# Patient Record
Sex: FEMALE | Race: BLACK OR AFRICAN AMERICAN | NOT HISPANIC OR LATINO | Employment: FULL TIME | URBAN - METROPOLITAN AREA
[De-identification: names, ages, dates, MRNs, and addresses within clinical notes are randomized per-mention and may not be internally consistent; named-entity substitution may affect disease eponyms.]

---

## 2018-04-15 ENCOUNTER — HOSPITAL ENCOUNTER (EMERGENCY)
Age: 23
Discharge: HOME | End: 2018-04-15
Attending: EMERGENCY MEDICINE | Admitting: EMERGENCY MEDICINE

## 2018-04-15 ENCOUNTER — APPOINTMENT (OUTPATIENT)
Dept: GENERAL RADIOLOGY | Age: 23
End: 2018-04-15
Attending: EMERGENCY MEDICINE

## 2018-04-15 VITALS
OXYGEN SATURATION: 100 % | SYSTOLIC BLOOD PRESSURE: 115 MMHG | RESPIRATION RATE: 18 BRPM | HEIGHT: 60 IN | BODY MASS INDEX: 19.44 KG/M2 | TEMPERATURE: 98.1 F | DIASTOLIC BLOOD PRESSURE: 84 MMHG | WEIGHT: 99 LBS | HEART RATE: 85 BPM

## 2018-04-15 DIAGNOSIS — R07.9 CHEST PAIN, UNSPECIFIED TYPE: Primary | ICD-10-CM

## 2018-04-15 LAB
B-HCG UR QL: NEGATIVE
BASOPHILS # BLD AUTO: 0 K/MCL (ref 0–0.3)
BASOPHILS NFR BLD AUTO: 1 %
BNP SERPL-MCNC: <5 PG/ML
D DIMER PPP FEU-MCNC: 0.21 MG/L (FEU)
DIFFERENTIAL METHOD BLD: ABNORMAL
EOSINOPHIL # BLD AUTO: 0 K/MCL (ref 0.1–0.5)
EOSINOPHIL NFR SPEC: 1 %
ERYTHROCYTE [DISTWIDTH] IN BLOOD: 12.1 % (ref 11–15)
HCT VFR BLD CALC: 33.3 % (ref 36–46.5)
HGB BLD-MCNC: 10.9 G/DL (ref 12–15.5)
IMM GRANULOCYTES # BLD AUTO: 0 K/MCL (ref 0–0.2)
IMM GRANULOCYTES NFR BLD: 0 %
LYMPHOCYTES # BLD MANUAL: 2.3 K/MCL (ref 1–4.8)
LYMPHOCYTES NFR BLD MANUAL: 51 %
MCH RBC QN AUTO: 27.1 PG (ref 26–34)
MCHC RBC AUTO-ENTMCNC: 32.7 G/DL (ref 32–36.5)
MCV RBC AUTO: 82.8 FL (ref 78–100)
MONOCYTES # BLD MANUAL: 0.3 K/MCL (ref 0.3–0.9)
MONOCYTES NFR BLD MANUAL: 6 %
NEUTROPHILS # BLD: 1.8 K/MCL (ref 1.8–7.7)
NEUTROPHILS NFR BLD AUTO: 41 %
NRBC BLD MANUAL-RTO: 0 %
PLATELET # BLD: 343 K/MCL (ref 140–450)
RBC # BLD: 4.02 MIL/MCL (ref 4–5.2)
WBC # BLD: 4.4 K/MCL (ref 4.2–11)

## 2018-04-15 PROCEDURE — 85379 FIBRIN DEGRADATION QUANT: CPT

## 2018-04-15 PROCEDURE — 99285 EMERGENCY DEPT VISIT HI MDM: CPT

## 2018-04-15 PROCEDURE — 85025 COMPLETE CBC W/AUTO DIFF WBC: CPT

## 2018-04-15 PROCEDURE — 93005 ELECTROCARDIOGRAM TRACING: CPT | Performed by: EMERGENCY MEDICINE

## 2018-04-15 PROCEDURE — 84484 ASSAY OF TROPONIN QUANT: CPT

## 2018-04-15 PROCEDURE — 83880 ASSAY OF NATRIURETIC PEPTIDE: CPT

## 2018-04-15 PROCEDURE — 80047 BASIC METABLC PNL IONIZED CA: CPT

## 2018-04-15 PROCEDURE — 71045 X-RAY EXAM CHEST 1 VIEW: CPT

## 2018-04-15 PROCEDURE — 10002803 HB RX 637: Performed by: EMERGENCY MEDICINE

## 2018-04-15 PROCEDURE — 81025 URINE PREGNANCY TEST: CPT | Performed by: EMERGENCY MEDICINE

## 2018-04-15 PROCEDURE — 71045 X-RAY EXAM CHEST 1 VIEW: CPT | Performed by: RADIOLOGY

## 2018-04-15 RX ORDER — ALBUTEROL SULFATE 90 UG/1
2 AEROSOL, METERED RESPIRATORY (INHALATION) ONCE
Status: COMPLETED | OUTPATIENT
Start: 2018-04-15 | End: 2018-04-15

## 2018-04-15 RX ADMIN — ALBUTEROL SULFATE 2 PUFF: 90 AEROSOL, METERED RESPIRATORY (INHALATION) at 21:15

## 2018-04-15 ASSESSMENT — ENCOUNTER SYMPTOMS
SORE THROAT: 1
EYE PAIN: 0
HEADACHES: 0
ABDOMINAL PAIN: 0
WEAKNESS: 0
NUMBNESS: 0
DIARRHEA: 0
DIAPHORESIS: 0
SHORTNESS OF BREATH: 1
BACK PAIN: 0
FEVER: 0
BRUISES/BLEEDS EASILY: 0
UNEXPECTED WEIGHT CHANGE: 0
COUGH: 1
DIZZINESS: 0
NAUSEA: 0
BLOOD IN STOOL: 0
CHEST TIGHTNESS: 1
VOMITING: 0
CHILLS: 0
ADENOPATHY: 0

## 2018-04-15 ASSESSMENT — HEART SCORE
HISTORY: SLIGHTLY SUSPICIOUS
HEART SCORE: 0
EKG: NORMAL
AGE: LESS THAN OR EQUAL TO 45
RISK FACTORS: NO RISK FACTORS KNOWN
TROPONIN: EQUAL OR LESS THAN NORMAL LIMIT

## 2018-04-15 ASSESSMENT — PAIN SCALES - GENERAL
PAINLEVEL_OUTOF10: 9
PAINLEVEL_OUTOF10: 5

## 2018-04-16 LAB
ANION GAP BLD CALC-SCNC: 16 MMOL/L
ATRIAL RATE (BPM): 100
BUN BLD-MCNC: 9 MG/DL (ref 6–20)
CA-I BLD ISE-SCNC: 1.13 MMOL/L (ref 1.15–1.29)
CHLORIDE BLD-SCNC: 104 MMOL/L (ref 98–107)
CO2 BLD-SCNC: 26 MMOL/L (ref 19–24)
CREAT BLD-MCNC: 0.7 MG/DL (ref 0.51–0.95)
CREAT BLD-MCNC: >90 MG/DL
GLUCOSE BLD-MCNC: 99 MG/DL (ref 65–99)
HCT VFR BLD CALC: 34 % (ref 36–46.5)
HGB BLD-MCNC: 11.6 G/DL (ref 12–15.5)
P AXIS (DEGREES): 76
POTASSIUM BLD-SCNC: 3.7 MMOL/L (ref 3.4–5.1)
PR-INTERVAL (MSEC): 140
QRS-INTERVAL (MSEC): 74
QT-INTERVAL (MSEC): 338
QTC: 436
R AXIS (DEGREES): 88
REPORT TEXT: NORMAL
SODIUM BLD-SCNC: 141 MMOL/L (ref 135–145)
T AXIS (DEGREES): 63
TROPONIN I BLD-MCNC: <0.1 NG/ML
VENTRICULAR RATE EKG/MIN (BPM): 100

## 2021-06-24 ENCOUNTER — IMMUNIZATION (OUTPATIENT)
Dept: NURSING | Facility: CLINIC | Age: 26
End: 2021-06-24
Payer: COMMERCIAL

## 2021-06-24 PROCEDURE — 0001A PR COVID VAC PFIZER DIL RECON 30 MCG/0.3 ML IM: CPT

## 2021-06-24 PROCEDURE — 91300 PR COVID VAC PFIZER DIL RECON 30 MCG/0.3 ML IM: CPT

## 2021-07-05 ENCOUNTER — OFFICE VISIT (OUTPATIENT)
Dept: OBGYN | Facility: CLINIC | Age: 26
End: 2021-07-05
Payer: COMMERCIAL

## 2021-07-05 VITALS
DIASTOLIC BLOOD PRESSURE: 64 MMHG | WEIGHT: 113 LBS | BODY MASS INDEX: 21.34 KG/M2 | HEIGHT: 61 IN | SYSTOLIC BLOOD PRESSURE: 106 MMHG

## 2021-07-05 DIAGNOSIS — Z11.1 SCREENING EXAMINATION FOR PULMONARY TUBERCULOSIS: Primary | ICD-10-CM

## 2021-07-05 DIAGNOSIS — Z30.46 ENCOUNTER FOR NEXPLANON REMOVAL: ICD-10-CM

## 2021-07-05 PROCEDURE — 11982 REMOVE DRUG IMPLANT DEVICE: CPT | Performed by: ADVANCED PRACTICE MIDWIFE

## 2021-07-05 PROCEDURE — 36415 COLL VENOUS BLD VENIPUNCTURE: CPT | Performed by: ADVANCED PRACTICE MIDWIFE

## 2021-07-05 PROCEDURE — 86481 TB AG RESPONSE T-CELL SUSP: CPT | Performed by: ADVANCED PRACTICE MIDWIFE

## 2021-07-05 SDOH — HEALTH STABILITY: MENTAL HEALTH: HOW OFTEN DO YOU HAVE A DRINK CONTAINING ALCOHOL?: NEVER

## 2021-07-05 SDOH — HEALTH STABILITY: MENTAL HEALTH: HOW OFTEN DO YOU HAVE 6 OR MORE DRINKS ON ONE OCCASION?: NEVER

## 2021-07-05 SDOH — HEALTH STABILITY: MENTAL HEALTH: HOW MANY STANDARD DRINKS CONTAINING ALCOHOL DO YOU HAVE ON A TYPICAL DAY?: NOT ASKED

## 2021-07-05 ASSESSMENT — MIFFLIN-ST. JEOR: SCORE: 1194.94

## 2021-07-05 NOTE — PROGRESS NOTES
Nexplanon Removal:     Is a pregnancy test required: No.  Was a consent obtained?  Yes    Noelle Johnson is here for removal of etonogestrel implant Nexplanon/Implanon    Indication: unscheduled bleeding. Declines troubleshooting Nexplanon today. Will likely accept paragard IUD-- reviewed condoms with any intercourse before insertion, may RTC at any time for insertion.    Preoperative Diagnosis: etonogestrel implant  Postoperative Diagnosis: etonogestrel implant removed    Technique: On the left arm  Skin prep Betadine  Anesthesia 1% lidocaine, with epi  Procedure: Small incision (<5mm) was made at distal end of palpable implant, curved hemostat or mosquito forceps was used to isolate the implant and bring it to the incision, the fibrous capsule containing the implant  was incised and the Implant was removed intact.      EBL: minimal  Complications:  No  Tolerance:  Pt tolerated procedure well and was in stable condition.   Dressing:    A pressure bandage was placed for the next 12-24 hours.    Contraception was discussed and patient chose the following method Parguard IUD      Follow up: Pt was instructed to call if bleeding, severe pain or foul smell.     Quantiferon gold ordered for pre-nursing school testing. Encouraged to follow up with primary care for other vaccinations.    Plan to offer GC/CT and pap smear if indicated with Paragard insertion.      SHEEBA Carlson CNM

## 2021-07-07 LAB
GAMMA INTERFERON BACKGROUND BLD IA-ACNC: 0.15 IU/ML
M TB IFN-G CD4+ BCKGRND COR BLD-ACNC: 9.85 IU/ML
M TB TUBERC IFN-G BLD QL: NEGATIVE
MITOGEN IGNF BCKGRD COR BLD-ACNC: 0 IU/ML
MITOGEN IGNF BCKGRD COR BLD-ACNC: 0 IU/ML

## 2021-07-08 ENCOUNTER — ALLIED HEALTH/NURSE VISIT (OUTPATIENT)
Dept: NURSING | Facility: CLINIC | Age: 26
End: 2021-07-08
Payer: COMMERCIAL

## 2021-07-08 DIAGNOSIS — Z23 NEED FOR VACCINATION: Primary | ICD-10-CM

## 2021-07-08 PROCEDURE — 90472 IMMUNIZATION ADMIN EACH ADD: CPT

## 2021-07-08 PROCEDURE — 90716 VAR VACCINE LIVE SUBQ: CPT

## 2021-07-08 PROCEDURE — 90746 HEPB VACCINE 3 DOSE ADULT IM: CPT

## 2021-07-08 PROCEDURE — 90471 IMMUNIZATION ADMIN: CPT

## 2021-07-15 ENCOUNTER — IMMUNIZATION (OUTPATIENT)
Dept: NURSING | Facility: CLINIC | Age: 26
End: 2021-07-15
Attending: INTERNAL MEDICINE
Payer: COMMERCIAL

## 2021-07-15 PROCEDURE — 0002A PR COVID VAC PFIZER DIL RECON 30 MCG/0.3 ML IM: CPT

## 2021-07-15 PROCEDURE — 91300 PR COVID VAC PFIZER DIL RECON 30 MCG/0.3 ML IM: CPT

## 2021-07-30 ENCOUNTER — HOSPITAL ENCOUNTER (EMERGENCY)
Facility: CLINIC | Age: 26
Discharge: HOME | End: 2021-07-31
Attending: EMERGENCY MEDICINE | Admitting: EMERGENCY MEDICINE
Payer: OTHER MISCELLANEOUS

## 2021-07-30 ENCOUNTER — NURSE TRIAGE (OUTPATIENT)
Dept: NURSING | Facility: CLINIC | Age: 26
End: 2021-07-30
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
## 2021-07-30 PROCEDURE — 99283 EMERGENCY DEPT VISIT LOW MDM: CPT

## 2021-07-31 VITALS
HEART RATE: 86 BPM | TEMPERATURE: 97.9 F | RESPIRATION RATE: 18 BRPM | SYSTOLIC BLOOD PRESSURE: 118 MMHG | DIASTOLIC BLOOD PRESSURE: 85 MMHG | OXYGEN SATURATION: 100 %

## 2021-07-31 PROCEDURE — 36415 COLL VENOUS BLD VENIPUNCTURE: CPT | Performed by: EMERGENCY MEDICINE

## 2021-07-31 PROCEDURE — 86706 HEP B SURFACE ANTIBODY: CPT | Performed by: EMERGENCY MEDICINE

## 2021-07-31 PROCEDURE — 86803 HEPATITIS C AB TEST: CPT | Performed by: EMERGENCY MEDICINE

## 2021-07-31 PROCEDURE — 87389 HIV-1 AG W/HIV-1&-2 AB AG IA: CPT | Performed by: EMERGENCY MEDICINE

## 2021-07-31 PROCEDURE — 87340 HEPATITIS B SURFACE AG IA: CPT | Performed by: EMERGENCY MEDICINE

## 2021-07-31 PROCEDURE — 87522 HEPATITIS C REVRS TRNSCRPJ: CPT | Performed by: EMERGENCY MEDICINE

## 2021-07-31 PROCEDURE — 86704 HEP B CORE ANTIBODY TOTAL: CPT | Performed by: EMERGENCY MEDICINE

## 2021-07-31 ASSESSMENT — ENCOUNTER SYMPTOMS: WOUND: 1

## 2021-07-31 NOTE — ED PROVIDER NOTES
History   Chief Complaint:  Body Fluid Exposure       HPI   Noelle Johnson is a 25 year old female who presents with body fluid exposure. The patient reports that she was stuck with a used insulin needle while at work earlier today. 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  The staff over there indicated the patient did not have any hepatitis or HIV, but the patient was concerned regarding the veracity of this information.  She does not believe she has any history of the same either.  She notes a small puncture wound initially but this is resolved to her left index finger, currently covered by a Band-Aid.  Immediately after the injury she washed her hands.    Review of Systems   Skin: Positive for wound (needle stick).   All other systems reviewed and are negative.        Allergies:  The patient has no known allergies.     Medications:  Prenatal vitamin    Past Medical History:     The patient denies significant past medical history.      Past Surgical History:     section    Social History:  The patient presents to the emergency department with her daughter.  The patient works in a group home.    Physical Exam     Patient Vitals for the past 24 hrs:   BP Temp Temp src Pulse Resp SpO2   21 (!) 129/99 97.9  F (36.6  C) Temporal 85 18 99 %       Physical Exam  Constitutional: Alert, attentive  CV: normal perfusion  Chest: Effort normal    MSK: Normal range of motion. bandaid to left index  Neurological: Alert, attentive  Skin: Skin is warm and dry.      Emergency Department Course   Laboratory:  Hepatitis B surface antibody immunity: Pending  Hepatitis B surface antigen: Pending  Hepatitis B core antibody: Pending    Hepatitis C RNA, Quantitative by PCR: Pending  Hepatitis C Antibody: Pending    HIV Antigen Antibody Combo: Pending    Emergency Department Course:  Reviewed:  I reviewed the patient's nursing notes, vitals, past medical records, and Care Everywhere.      Assessments:  0123 I performed an exam of the patient and obtained history, as documented above.     0243 I rechecked the patient and discussed the plan. Patient is comfortable with discharge to home at this time.    Disposition:  The patient was discharged to home.     Impression & Plan   Medical Decision Making:  This is a pleasant 25-year-old female presents for evaluation after accidental needlestick injury.  Source information is unknown.  Screening labs are drawn and we have referred the patient to follow-up with the group home protocol regarding drawing labs for the source patient.  At this time she would like to take HIV PrEP so this is initiated prescribed to the patient.  Follow-up with occupational health for flare up protocol.  Return precautions for any concerns.    Diagnosis:    ICD-10-CM   1. Accident caused by hypodermic needle, initial encounter  W46.0XXA       Discharge Medications:  Current Discharge Medication List          Scribe Disclosure:  Iveth MEADOWS, am serving as a scribe at 1:28 AM on 7/31/2021 to document services personally performed by Trever Elise MD, based on my observations and the provider's statements to me.    July 30, 2021   [facility] Emergency Department     Trever Elise MD  07/31/21 0549

## 2021-07-31 NOTE — TELEPHONE ENCOUNTER
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 Pt. Poked her left hand finger next to her thumb. Pt. Says that the poke did draw blood. Pt. Says that she did not fill out an incident report at her work but told her boss. Pt. Will go to the [facility] ER now. COVID 19 Nurse Triage Plan/Patient Instructions    Please be aware that novel coronavirus (COVID-19) may be circulating in the community. If you develop symptoms such as fever, cough, or SOB or if you have concerns about the presence of another infection including coronavirus (COVID-19), please contact your health care provider or visit [email or web address].     Disposition/Instructions    ED Visit recommended. Follow protocol based instructions.     Bring Your Own Device:  Please also bring your smart device(s) (smart phones, tablets, laptops) and their charging cables for your personal use and to communicate with your care team during your visit.    Thank you for taking steps to prevent the spread of this virus.  o Limit your contact with others.  o Wear a simple mask to cover your cough.  o Wash your hands well and often.    Resources    [facility]: About COVID-19: [email or web address]    CDC: What to Do If You're Sick: www.cdc.gov/coronavirus/2019-ncov/about/steps-when-sick.html    CDC: Ending Home Isolation: www.cdc.gov/coronavirus/2019-ncov/hcp/disposition-in-home-patients.html     CDC: Caring for Someone: www.cdc.gov/coronavirus/2019-ncov/if-you-are-sick/care-for-someone.html     [facility]: Interim Guidance for Hospital Discharge to Home: www.health.[facility].[place]/diseases/coronavirus/hcp/hospdischarge.pdf    [facility] clinical trials (COVID-19 research studies): clinicalaffairs.[facility]/umn-clinical-trials     Below are the COVID-19 hotlines at the [place] Department of [facility] ([facility]). Interpreters are available.   o For health questions: Call  [phone] or [phone] (7 a.m. to 7 p.m.)  o For questions about schools and childcare: Call 380-367-3215 or 1-235.839.9172 (7 a.m. to 7 p.m.)

## 2021-07-31 NOTE — ED TRIAGE NOTES
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 Pt reports she was stuck by a used insulin pen needle. ABCs intact.

## 2021-08-01 LAB
HBV CORE AB SERPL QL IA: NONREACTIVE
HCV AB SERPL QL IA: NONREACTIVE

## 2021-08-02 ENCOUNTER — OFFICE VISIT (OUTPATIENT)
Dept: OBGYN | Facility: CLINIC | Age: 26
End: 2021-08-02
Payer: COMMERCIAL

## 2021-08-02 VITALS — BODY MASS INDEX: 21.5 KG/M2 | SYSTOLIC BLOOD PRESSURE: 108 MMHG | DIASTOLIC BLOOD PRESSURE: 68 MMHG | WEIGHT: 113.8 LBS

## 2021-08-02 DIAGNOSIS — Z30.42 SURVEILLANCE FOR DEPO-PROVERA CONTRACEPTION: Primary | ICD-10-CM

## 2021-08-02 DIAGNOSIS — Z30.09 BIRTH CONTROL COUNSELING: ICD-10-CM

## 2021-08-02 LAB
HBV SURFACE AB SERPL IA-ACNC: 0.79 M[IU]/ML
HBV SURFACE AG SERPL QL IA: NONREACTIVE
HCG IFA URINE: NEGATIVE
HIV 1+2 AB+HIV1 P24 AG SERPL QL IA: NONREACTIVE

## 2021-08-02 PROCEDURE — 99213 OFFICE O/P EST LOW 20 MIN: CPT | Mod: 25 | Performed by: ADVANCED PRACTICE MIDWIFE

## 2021-08-02 PROCEDURE — 84703 CHORIONIC GONADOTROPIN ASSAY: CPT | Performed by: ADVANCED PRACTICE MIDWIFE

## 2021-08-02 PROCEDURE — 96372 THER/PROPH/DIAG INJ SC/IM: CPT | Performed by: ADVANCED PRACTICE MIDWIFE

## 2021-08-02 RX ORDER — MEDROXYPROGESTERONE ACETATE 150 MG/ML
150 INJECTION, SUSPENSION INTRAMUSCULAR
Status: COMPLETED | OUTPATIENT
Start: 2021-08-02 | End: 2022-08-04

## 2021-08-02 RX ADMIN — MEDROXYPROGESTERONE ACETATE 150 MG: 150 INJECTION, SUSPENSION INTRAMUSCULAR at 11:17

## 2021-08-02 NOTE — PATIENT INSTRUCTIONS
Patient Education     Depo-Provera Injection 150 mg/mL  Uses  This medicine is used for the following purposes:    birth control    menstrual problem  Instructions  This medicine will be given to you at the doctor's office.  This medicine may cause you to become more sensitive to the sun. Use sunscreen or wear protective clothing when you are exposed to the sun.  Please tell your doctor and pharmacist about all the medicines you take. Include both prescription and over-the-counter medicines. Also tell them about any vitamins, herbal medicines, or anything else you take for your health.  This medicine may affect the strength of your bones. If you have or are at increased risk for developing osteoporosis (weakening of the bones), your doctor may recommend adding foods containing calcium and vitamin D while on this medicine. Please talk to your doctor for more information.  You may need vitamin and mineral supplements while on this medicine. Please speak with your doctor or pharmacist.  It is very important that you follow your doctor's instructions for all blood tests.  This medicine may interfere with some lab test results. Be sure to tell all your healthcare providers that you are taking this medicine.  It is very important that you keep all appointments for medical exams and tests while on this medicine.  Cautions  Tell your doctor and pharmacist if you ever had an allergic reaction to a medicine. Symptoms of an allergic reaction can include trouble breathing, skin rash, itching, swelling, or severe dizziness.  Some patients with weak hearts may have worsening of symptoms. If you notice difficulty breathing, weight gain, or swelling of your legs or ankles, let your doctor know right away.  This medicine is associated with a rare but very serious medical condition. Please speak with your doctor about symptoms you should look out for while on this medicine. Notify your doctor immediately if you develop those  symptoms.  Some patients taking this medicine have experienced serious side effects. Please speak with your doctor to understand the risks and benefits associated with this medicine.  This medicine is associated with an increased risk of serious heart problems, heart attack, and stroke. Please speak with your doctor about the risks and benefits of using this medicine. Contact your doctor immediately if you experience chest pain or difficulty breathing.  This medicine is associated with an increased risk for serious blood clots. Speak with your doctor about the benefits and risks from using this medicine.  Please check with your doctor before drinking alcohol while on this medicine.  Avoid smoking while on this medicine. Smoking may increase your risk for stroke, heart attack, blood clots, high blood pressure, and other diseases of the heart and blood vessels.  Call the doctor if there are any signs of confusion or unusual changes in behavior.  Tell the doctor or pharmacist if you are pregnant, planning to be pregnant, or breastfeeding.  Do not use this medicine if you are pregnant. If you become pregnant while on this medicine, contact your doctor immediately.  Do not take [place]'s wort while on this medicine.  Ask your pharmacist if this medicine can interact with any of your other medicines. Be sure to tell them about all the medicines you take.  Please tell all your doctors and dentists that you are on this medicine before they provide care.  Do not start or stop any other medicines without first speaking to your doctor or pharmacist.  If you have had a heart attack or a stroke within the past 6 months, talk to your doctor before using this medicine.  Side Effects  The following is a list of some common side effects from this medicine. Please speak with your doctor about what you should do if you experience these or other side effects.    acne    bloating    breast pain or swelling    dizziness    feeling of  heat or flushing    headaches    reaction at the area of the injection (pain, redness, swelling)    nausea    problems with sexual functions or desire    stomach pain    vaginal bleeding or spotting between periods    weight gain  Call your doctor or get medical help right away if you notice any of these more serious side effects:    increased risk of a blood clot    bone pain    breast lump or discharge    chest pain    changes in memory, mood, or thinking    depression or feeling sad    swelling of the legs, feet, and hands    fainting    severe or persistent headache    sudden leg pain, swelling, warmth or redness    symptoms of liver damage (such as yellowing of skin or eyes, dark urine, unusual tiredness or weakness; severe stomach or back pain)    dark patches on skin or face    mood changes    seizures    shortness of breath    symptoms of stroke (such as one-sided weakness, slurred speech, confusion)    cramping of the uterus or bleeding from the vagina    blurring or changes of vision    severe or persistent vomiting    sudden or unexplained weight gain  A few people may have an allergic reactions to this medicine. Symptoms can include difficulty breathing, skin rash, itching, swelling, or severe dizziness. If you notice any of these symptoms, seek medical help quickly.  Extra  Please speak with your doctor, nurse, or pharmacist if you have any questions about this medicine.  [email or web address]  IMPORTANT NOTE: This document tells you briefly how to take your medicine, but it does not tell you all there is to know about it.Your doctor or pharmacist may give you other documents about your medicine. Please talk to them if you have any questions.Always follow their advice. There is a more complete description of this medicine available in English.Scan this code on your smartphone or tablet or use the web address below. You can also ask your pharmacist for a printout. If you have any  questions, please ask your pharmacist.     2021 [place].

## 2021-08-02 NOTE — NURSING NOTE
Clinic Administered Medication Documentation          Depo Provera Documentation    URINE HCG: negative    Depo-Provera Standing Order inclusion/exclusion criteria reviewed.   Patient meets: inclusion criteria     BP: 108/68  LAST PAP/EXAM: No results found for: PAP    Prior to injection, verified patient identity using patient's name and date of birth. Medication was administered. Please see MAR and medication order for additional information.     Was entire vial of medication used? Yes  Vial/Syringe: Single dose vial  Expiration Date:  12/23    Patient instructed to remain in clinic for 15 minutes and report any adverse reaction to staff immediately .  NEXT INJECTION DUE: 10/18/21 - 11/1/21

## 2021-08-02 NOTE — NURSING NOTE
"Chief Complaint   Patient presents with     Contraception     Discuss Birth Control       Initial /68 (BP Location: Left arm, Cuff Size: Adult Regular)   Wt 51.6 kg (113 lb 12.8 oz)   LMP 2021 (Approximate)   Breastfeeding No   BMI 21.50 kg/m   Estimated body mass index is 21.5 kg/m  as calculated from the following:    Height as of 21: 1.549 m (5' 1\").    Weight as of this encounter: 51.6 kg (113 lb 12.8 oz).  BP completed using cuff size: regular    Questioned patient about current smoking habits.  Pt. has never smoked.          The following HM Due: NONE           "

## 2021-08-02 NOTE — PROGRESS NOTES
"SUBJECTIVE:   Noelle Johnson is a 25 year old who presents to the clinic for discussion of birth control methods.   She is interested in discussing options \"that won't cause a lot of bleeding.\"    She most recently had the Nexplanon, which was placed in 2021 and removed 2021 due to increased, irregular daily bleeding. At that time, she seemed interested in the ParaGard IUD and planned to come back for placement, but she is feeling a bit more hesitant today and would like a little more review of other options.    Discussed expected bleeding profile with the different types of LARCs and their mechanisms of action. Explained that irregular, untimed bleeding may last up to six months and briefly talked about ways this can be managed. Reviewed CHC options that would provide more reliable and regular withdrawal bleeds. She is neither interested in daily/weekly dosing nor keen about inserting a vaginal ring and feels nervous about IUD placement. She feels more inclined to receive Depo today to allow herself time to reconsider other options and states she may be open to trying the Nexplanon again.    History reviewed. No pertinent past medical history. No pertinent family history.    Past Surgical History:   Procedure Laterality Date      SECTION       OB History    Para Term  AB Living   1 1 0 0 0 0   SAB TAB Ectopic Multiple Live Births   0 0 0 0 1      # Outcome Date GA Lbr Danish/2nd Weight Sex Delivery Anes PTL Lv   1 Para 10/03/20    F CS-Unspec        Menstrual/Gyn History:  Monthly, every 28-30 days  Length of menses: 3-4 days  Menstrual description: normal, without significant cramping/clots  Sexually active with males, in monogamous relationship; using condoms since Nexplanon removal    Denies the following contraindications to estrogen/progesterone combined contraception:  Migraine with aura  Smoking over age 35  Liver disease  Personal history of blood clot or stroke   History " of heart disease  History of breast cancer  Undiagnosed vaginal bleeding  Hypertension  Pregnancy      ROS:   10 point ROS negative other than the symptoms noted above in the HPI.      EXAM:  /68 (BP Location: Left arm, Cuff Size: Adult Regular)   Wt 51.6 kg (113 lb 12.8 oz)   LMP 07/12/2021 (Approximate)   Breastfeeding No   BMI 21.50 kg/m    Body mass index is 21.5 kg/m .     Constitutional: healthy, alert, in no acute distress  Psych: mentation normal, mood/afffect normal/bright    ASSESSMENT/PLAN:    ICD-10-CM    1. Surveillance for Depo-Provera contraception  Z30.42 HCG IFA Urine POCT, Enter/Edit Result     medroxyPROGESTERone (DEPO-PROVERA) injection 150 mg   2. Birth control counseling  Z30.09      There are no contraindications to the use of Depo Provera    COUNSELING:  Reviewed risks and benefits of contraceptive use  Return to clinic in three months for next injection or initiation of another contraceptive method per patient preference.  Discussed proper use of chosen method  Handouts/Instrucions provided      Josephine Loyd, WEDNI, APRN, CNM

## 2021-08-04 ENCOUNTER — TELEPHONE (OUTPATIENT)
Dept: EMERGENCY MEDICINE | Facility: CLINIC | Age: 26
End: 2021-08-04

## 2021-08-04 LAB — HCV RNA SERPL NAA+PROBE-ACNC: NOT DETECTED IU/ML

## 2021-08-04 NOTE — TELEPHONE ENCOUNTER
The following blood and bodily fluid lab results from a recent exposure were all negative (nonreactive) for:     Hepatitis C antibody     Hepatitis C RNA quantitative    Hepatitis B surface antigen (agn)    Hepatitis B surface antibody (douglas)  [Note: If vaccinated for Hep B (3 shot series) and result Negative, Patient is instructed to followup with PCP clinic within 72 hours].    Hepatitis B Core antibody    HIV Antigen Antibody Combo.     [Note: If Hepatitis B surface ANTIBODY (douglas) is reactive/positive, this indicates Patient has immunity]  Recommendations in Treatment per [facility] ED lab result Blood exposure protocol      4:18 Spoke with patient and relayed results, asked patient to have repeat labs in 6 weeks with primary, she stated understanding.  Patient confirms only one injection of the Hep B vaccine series.  Reports her finger is healed  Irina Martínez  [facility]  Emergency Dept Lab Result RN  Ph# [phone]

## 2021-08-22 ENCOUNTER — HEALTH MAINTENANCE LETTER (OUTPATIENT)
Age: 26
End: 2021-08-22

## 2021-08-31 ENCOUNTER — ALLIED HEALTH/NURSE VISIT (OUTPATIENT)
Dept: INTERNAL MEDICINE | Facility: CLINIC | Age: 26
End: 2021-08-31
Payer: COMMERCIAL

## 2021-08-31 DIAGNOSIS — Z23 NEED FOR VACCINATION: Primary | ICD-10-CM

## 2021-08-31 PROCEDURE — 99207 PR NO CHARGE NURSE ONLY: CPT

## 2021-08-31 PROCEDURE — 90471 IMMUNIZATION ADMIN: CPT

## 2021-08-31 PROCEDURE — 90746 HEPB VACCINE 3 DOSE ADULT IM: CPT

## 2021-10-17 ENCOUNTER — HEALTH MAINTENANCE LETTER (OUTPATIENT)
Age: 26
End: 2021-10-17

## 2021-10-19 ENCOUNTER — ALLIED HEALTH/NURSE VISIT (OUTPATIENT)
Dept: INTERNAL MEDICINE | Facility: CLINIC | Age: 26
End: 2021-10-19
Payer: COMMERCIAL

## 2021-10-19 DIAGNOSIS — Z30.9 CONTRACEPTIVE MANAGEMENT: Primary | ICD-10-CM

## 2021-10-19 PROCEDURE — 99207 PR NO CHARGE NURSE ONLY: CPT

## 2021-12-10 ENCOUNTER — OFFICE VISIT (OUTPATIENT)
Dept: INTERNAL MEDICINE | Facility: CLINIC | Age: 26
End: 2021-12-10
Payer: COMMERCIAL

## 2021-12-10 VITALS
OXYGEN SATURATION: 99 % | BODY MASS INDEX: 22.71 KG/M2 | WEIGHT: 120.2 LBS | RESPIRATION RATE: 16 BRPM | DIASTOLIC BLOOD PRESSURE: 66 MMHG | SYSTOLIC BLOOD PRESSURE: 110 MMHG | TEMPERATURE: 97.7 F | HEART RATE: 96 BPM

## 2021-12-10 DIAGNOSIS — M54.6 BILATERAL THORACIC BACK PAIN, UNSPECIFIED CHRONICITY: Primary | ICD-10-CM

## 2021-12-10 PROCEDURE — 99213 OFFICE O/P EST LOW 20 MIN: CPT | Performed by: PHYSICIAN ASSISTANT

## 2021-12-10 NOTE — PROGRESS NOTES
Assessment & Plan     Bilateral thoracic back pain, unspecified chronicity  Reviewed muscular issue  OTC heat and icing recommend  PT referral   - [facility]; Future             See Patient Instructions    Return in about 4 weeks (around 1/7/2022) for recheck if not improving, regular primary provider.    JEWELS [facility] JULIENNE Drake is a 26 year old who presents for the following health issues     HPI     Pain History:  When did you first notice your pain? - Last year october  Have you seen anyone else for your pain? No  Where in your body do you have pain? Back Pain  Onset/Duration: Last year october  Description:   Location of pain: middle of back bilateral  Character of pain: dull ache  Pain radiation: none  New numbness or weakness in legs, not attributed to pain: no   Intensity: mild, moderate  Progression of Symptoms: same and intermittent  History:   Specific cause: none  Pain interferes with job: no  History of back problems:   Any previous MRI or X-rays: None  Sees a specialist for back pain: No  Alleviating factors:   Improved by: rest    Precipitating factors:  Worsened by: Standing  Therapies tried and outcome:     Accompanying Signs & Symptoms:  Risk of Fracture: None  Risk of Cauda Equina: None  Risk of Infection: None  Risk of Cancer: None  Risk of Ankylosing Spondylitis: Onset at age <35, male, AND morning back stiffness  no               Review of Systems   Constitutional, HEENT, cardiovascular, pulmonary, gi and gu systems are negative, except as otherwise noted.      Objective    /66   Pulse 96   Temp 97.7  F (36.5  C) (Tympanic)   Resp 16   Wt 54.5 kg (120 lb 3.2 oz)   SpO2 99%   BMI 22.71 kg/m    Body mass index is 22.71 kg/m .  Physical Exam   GENERAL: healthy, alert and no distress  RESP: lungs clear to auscultation - no rales, rhonchi or wheezes  CV: regular rates and rhythm and normal S1 S2, no S3  or S4  MS: no spinous tenderness on exam  Mild pain paraspinous thoracic bilaterally around level of braline     SKIN: no suspicious lesions or rashes

## 2021-12-10 NOTE — PATIENT INSTRUCTIONS
Ibuprofen 600 mg up to three times a day    Or Acetaminophen ( Tylenol)  As needed.      Patient Education     Back Exercises: Arm Reach    Do this exercise on your hands and knees. Keep your knees under your hips and your hands under your shoulders. Keep your spine in a neutral position (not arched or sagging). Be sure to maintain your neck s natural curve:    Stretch one arm straight out in front of you. Don t raise your head or let your supporting shoulder sag.    Hold for 5 seconds.    Return to starting position.    Repeat 5 times.    Switch arms.  Anjuke last reviewed this educational content on 3/1/2018    5655-9536 [employer] All rights reserved. This information is not intended as a substitute for professional medical care. Always follow your healthcare professional's instructions.           Patient Education     Back Exercises: Back Press    Do this exercise on your hands and knees. Keep your knees under your hips and your hands under your shoulders. Keep your spine in a neutral position (not arched or sagging). Be sure to maintain your neck s natural curve:    Tighten your stomach and buttock muscles to press your back upward. Let your head drop slightly.    Hold for 5 seconds. Return to starting position.    Repeat 5 times.  Anjuke last reviewed this educational content on 3/1/2018    2123-0839 [employer]. All rights reserved. This information is not intended as a substitute for professional medical care. Always follow your healthcare professional's instructions.           Patient Education     Back Exercises: Back Release  Do this exercise on your hands and knees. Keep your knees under your hips and your hands under your shoulders.        Relax your abdominal and buttocks muscles, lift your head, and let your back sag. Be sure to keep your weight evenly distributed. Don t sit back on your hips.     Hold for 5 seconds.    Return to starting position.    Tuck your head  and lift (arch) your back.    Hold for 5 seconds    Return to starting position.    Repeat 5 times.  [employer] last reviewed this educational content on 3/1/2018    0109-3582 [employer]. All rights reserved. This information is not intended as a substitute for professional medical care. Always follow your healthcare professional's instructions.           Patient Education     Back Exercises: Back Extension with Elbow Press    To start, lie face down on your stomach, feet slightly apart, forehead on the floor. Breathe deeply. You should feel comfortable and relaxed in this position.    Press up on your forearms. Keep your stomach and hips on the floor. Stay within your painfree range.    Hold for 20 seconds. Lower slowly.    Repeat 2 times.    Return to starting position.  [employer] last reviewed this educational content on 3/1/2018    8213-4474 [employer]. All rights reserved. This information is not intended as a substitute for professional medical care. Always follow your healthcare professional's instructions.           Patient Education     Back Exercises: Seated Rotation    To start, sit in a chair with your feet flat on the floor. Shift your weight slightly forward to avoid rounding your back. Relax, and keep your ears, shoulders, and hips aligned:    Fold your arms and elbows just below shoulder height.    Turn from the waist with hips forward. Turn your head last. Do not push through the pain.    Hold for a count of 10 to 30. Return to starting position.    Repeat 3 to 5 times on one side. Then switch sides.  [employer] last reviewed this educational content on 3/1/2018    0482-8270 [employer]. All rights reserved. This information is not intended as a substitute for professional medical care. Always follow your healthcare professional's instructions.           Patient Education     Back Exercises: Side Stretch    To start, sit in a chair with your feet flat on the  floor. Shift your weight slightly forward to avoid rounding your back. Relax. Keep your ears, shoulders, and hips aligned:    Stretch your right arm overhead.    Slowly bend to the left. Don t twist your torso. Stay within your pain limits.    Hold for 20 seconds. Return to starting position.    Repeat 2 to 5 times. Then, switch to the other side.  [employer] last reviewed this educational content on 3/1/2018    [id number] [employer]. All rights reserved. This information is not intended as a substitute for professional medical care. Always follow your healthcare professional's instructions.           Patient Education     Shoulder Squeeze Exercise    To start, sit in a chair with your feet flat on the floor. Shift your weight slightly forward so you don't round your back. Relax. Keep your ears, shoulders, and hips aligned:    Raise your arms to shoulder height, elbows bent and palms forward.    Move your arms back, squeezing your shoulder blades together.    Hold for 10 seconds. Return to starting position.     Repeat 5 times.   For your safety, check with your healthcare provider before starting an exercise program.  Deepali last reviewed this educational content on 7/1/2020 2000-2021 [employer]. All rights reserved. This information is not intended as a substitute for professional medical care. Always follow your healthcare professional's instructions.

## 2021-12-23 ENCOUNTER — THERAPY VISIT (OUTPATIENT)
Dept: PHYSICAL THERAPY | Facility: CLINIC | Age: 26
End: 2021-12-23
Attending: PHYSICIAN ASSISTANT
Payer: COMMERCIAL

## 2021-12-23 DIAGNOSIS — M54.6 BILATERAL THORACIC BACK PAIN, UNSPECIFIED CHRONICITY: ICD-10-CM

## 2021-12-23 PROCEDURE — 97162 PT EVAL MOD COMPLEX 30 MIN: CPT | Mod: GP | Performed by: PHYSICAL THERAPIST

## 2021-12-23 PROCEDURE — 97110 THERAPEUTIC EXERCISES: CPT | Mod: GP | Performed by: PHYSICAL THERAPIST

## 2021-12-23 NOTE — PROGRESS NOTES
[facility]: Physical Therapy Initial Evaluation   Dec 23, 2021    Subjective:   Chief Complaint: upper back pain    Pain: across the upper back, both sides   Numbness/Tingling: None   Stiffness: in the upper back  New/Recurrent/Chronic: Chronic  DOI/onset: 2020   Referral Date: 12/10/2021 - Maddison Colon PA-C  Mechanism of onset: sitting in school following a   PMH/surgical history/trauma:    - History of  (2020)  General health as reported by patient: Good   Medications: None  Occupation: Student (nursing) Job duties: prolonged sitting,   Previous Treatment (Effect): pain pills (helpful) ; stretching (hurt when she tried it) ;   Imaging: No imaging on file  Quality of Pain: dull ache  Pain: 5/10 at present, 5/10 at worst  Worse: bending, standing,   Better: sitting/resting  Progression of Symptoms since onset: about the same   Sleeping: no disturbance   Current Functional Status: SEE GOALS FLOWSHEET  - standing - increased pain with standing after about 1 hour  - housework - increased pain with bending chores after about 1 hour (scrubbing floor, doing laundry, etc)  - lifting - will be difficult if she already has pain  Previous Functional Status: no restrictions  Current HEP/exercise regimen: None  Transportation to Therapy: Independent with transportation  Live with Others: Lives with two roommates, 1 child (just over 1 year old)    Patient's goal(s): Get rid of the pain.       Objective:    Posture: forward head posture    Cervical Screen: negative for symptoms, slightly decreased left rotation/sidebending compared to the right. Patient states that she felt should could go just as far in both directions, indicating impaired proprioception.     Thoracic AROM: (Major, Moderate, Minimal or Nil loss)  Movement Loss Matthew Mod Min Nil Pain   Flexion   x  cc   Extension  x x  cc   Rotation L  x x  cc   Rotation R   x  cc   Side Bend L  x x  cc - felt  something go across her back   Side Bend R   x x        Ribs Screen: decreased excursion with breathing in prone    Thoracic Mobility/Spring Testing: mild stiffness throughout.     Palpation: Increased tone in the left upper trap.     Other Tests:   - 3+/5 bilateral middle trap  - 3/5 bilateral lower trap, reproduces similar symptoms to chief complaint.   - Notes that she carries her child most of the time when they go out.   - Patient had difficulty performing scapular depression, often moving into elevation.           Assessment/Plan:    Patient is a 26 year old female with thoracic complaints.    Patient has the following significant findings with corresponding treatment plan.                Referring Diagnosis: Bilateral thoracic back pain, unspecified chronicity     Pain -  hot/cold therapy, manual therapy, splint/taping/bracing/orthotics, self management, education, directional preference exercise and home program  Decreased ROM/flexibility - manual therapy, therapeutic exercise, therapeutic activity and home program  Decreased joint mobility - manual therapy, therapeutic exercise, therapeutic activity and home program  Decreased strength - therapeutic exercise, therapeutic activities and home program  Decreased proprioception - neuro re-education, therapeutic activities and home program  Impaired muscle performance - neuro re-education and home program  Decreased function - therapeutic activities and home program  Impaired posture - neuro re-education, therapeutic activities and home program      Therapy Evaluation Codes:   1) History comprised of:   Personal factors that impact the plan of care:      Living environment and Profession.    Comorbidity factors that impact the plan of care are:      History of .     Medications impacting care: None.  2) Examination of Body Systems comprised of:   Body structures and functions that impact the plan of care:      Cervical spine, Lumbar spine and Thoracic  Spine.   Activity limitations that impact the plan of care are:      Bending and Standing.  3) Clinical presentation characteristics are:   Evolving/Changing.  4) Decision-Making    Moderate complexity using standardized patient assessment instrument and/or measureable assessment of functional outcome.  Cumulative Therapy Evaluation is: Moderate complexity.    Previous and current functional limitations:  (See Goal Flow Sheet for this information)    Short term and Long term goals: (See Goal Flow Sheet for this information)     Communication ability:  Patient appears to be able to clearly communicate and understand verbal and written communication and follow directions correctly.  Treatment Explanation - The following has been discussed with the patient:   RX ordered/plan of care  Anticipated outcomes  Possible risks and side effects  This patient would benefit from PT intervention to resume normal activities.   Rehab potential is good.    Frequency:  1 X week, once daily  Duration:  for 8 weeks  Discharge Plan:  Achieve all LTG.  Independent in home treatment program.  Reach maximal therapeutic benefit.    Please refer to the daily flowsheet for treatment today, total treatment time and time spent performing 1:1 timed codes.

## 2021-12-23 NOTE — PROGRESS NOTES
[facility]    OUTPATIENT Physical Therapy ORTHOPEDIC EVALUATION  PLAN OF TREATMENT FOR OUTPATIENT REHABILITATION  (COMPLETE FOR INITIAL CLAIMS ONLY)  Patient's Last Name, First Name, M.I.  YOB: 1995  Noelle Johnson    Provider s Name:  [facility]   Medical Record No.  [id number]   Start of Care Date:  12/23/21   Onset Date:   10/15/21   Type:     _X__PT   ___OT Medical Diagnosis:    Encounter Diagnosis   Name Primary?     Bilateral thoracic back pain, unspecified chronicity         Treatment Diagnosis:  Thoracic Back Pain        Goals:     12/23/21 0500   Body Part   Goals listed below are for Back   Goal #1   Goal #1 standing   Current Functional Level Hours patient can stand   Performance level 1   STG Target Performance Hours patient will be able to stand   Performance level 2   Rationale for housekeeping tasks such as vacuuming, bed making, mowing, gardening   Due date 01/20/22   LTG Target Performance Hours patient will be able to stand   Performance Level as needed for household chores   Rationale for housekeeping tasks such as vacuuming, bed making, mowing   Due date 02/17/22   Goal #2   Goal #2 bending   Previous Functional Level No restrictions   Performance level Pain with bending/prolonged work while bending   Performance level Able to perform bending work for 1 hour without symptoms   Rationale for care of infant/toddler;for household tasks such as bedmaking, emptying diswasher, washer and dryer, washing floors   Due date 01/20/22   Performance Level Able to perform bending work as needed without pain   Rationale for care of infant/toddler;for household tasks such as bedmaking, emptying diswasher, washer and dryer, washing floors   Due date 02/17/22       Therapy Frequency:  1x per week  Predicted Duration of Therapy Intervention:  8 weeks    Dexter Curran                  I CERTIFY THE NEED FOR THESE SERVICES FURNISHED UNDER        THIS PLAN OF TREATMENT AND WHILE UNDER MY CARE     (Physician attestation of this document indicates review and certification of the therapy plan).                       Certification Date From:  12/23/21   Certification Date To:  03/03/22    Referring Provider:  Maddison Colon    Initial Assessment        See Epic Evaluation SOC Date: 12/23/21

## 2022-01-06 ENCOUNTER — THERAPY VISIT (OUTPATIENT)
Dept: PHYSICAL THERAPY | Facility: CLINIC | Age: 27
End: 2022-01-06
Payer: COMMERCIAL

## 2022-01-06 ENCOUNTER — OFFICE VISIT (OUTPATIENT)
Dept: FAMILY MEDICINE | Facility: CLINIC | Age: 27
End: 2022-01-06
Payer: COMMERCIAL

## 2022-01-06 VITALS
BODY MASS INDEX: 22.86 KG/M2 | OXYGEN SATURATION: 98 % | HEART RATE: 81 BPM | DIASTOLIC BLOOD PRESSURE: 60 MMHG | WEIGHT: 121 LBS | SYSTOLIC BLOOD PRESSURE: 100 MMHG

## 2022-01-06 DIAGNOSIS — M54.6 BILATERAL THORACIC BACK PAIN, UNSPECIFIED CHRONICITY: ICD-10-CM

## 2022-01-06 DIAGNOSIS — Z23 NEED FOR HPV VACCINE: ICD-10-CM

## 2022-01-06 DIAGNOSIS — Z30.09 GENERAL COUNSELING FOR PRESCRIPTION OF ORAL CONTRACEPTIVES: ICD-10-CM

## 2022-01-06 DIAGNOSIS — N92.1 MENORRHAGIA WITH IRREGULAR CYCLE: Primary | ICD-10-CM

## 2022-01-06 LAB — HCG UR QL: NEGATIVE

## 2022-01-06 PROCEDURE — 81025 URINE PREGNANCY TEST: CPT | Performed by: FAMILY MEDICINE

## 2022-01-06 PROCEDURE — 90651 9VHPV VACCINE 2/3 DOSE IM: CPT | Performed by: FAMILY MEDICINE

## 2022-01-06 PROCEDURE — 90471 IMMUNIZATION ADMIN: CPT | Performed by: FAMILY MEDICINE

## 2022-01-06 PROCEDURE — 99213 OFFICE O/P EST LOW 20 MIN: CPT | Mod: 25 | Performed by: FAMILY MEDICINE

## 2022-01-06 PROCEDURE — 97112 NEUROMUSCULAR REEDUCATION: CPT | Mod: GP | Performed by: PHYSICAL THERAPIST

## 2022-01-06 PROCEDURE — 96372 THER/PROPH/DIAG INJ SC/IM: CPT | Performed by: ADVANCED PRACTICE MIDWIFE

## 2022-01-06 PROCEDURE — 97110 THERAPEUTIC EXERCISES: CPT | Mod: GP | Performed by: PHYSICAL THERAPIST

## 2022-01-06 RX ORDER — MEDROXYPROGESTERONE ACETATE 150 MG/ML
150 INJECTION, SUSPENSION INTRAMUSCULAR
Status: DISCONTINUED | OUTPATIENT
Start: 2022-01-06 | End: 2022-01-06

## 2022-01-06 RX ADMIN — MEDROXYPROGESTERONE ACETATE 150 MG: 150 INJECTION, SUSPENSION INTRAMUSCULAR at 11:26

## 2022-01-06 NOTE — PROGRESS NOTES
Assessment / Plan    Noelle was seen today for abnormal bleeding problem.    Diagnoses and all orders for this visit:    Menorrhagia with irregular cycle    General counseling for prescription of oral contraceptives  -     HCG qualitative urine; Future  -     HCG qualitative urine  -     medroxyPROGESTERone (DEPO-PROVERA) injection 150 mg    Need for HPV vaccine  -     HPV, IM (9 - 26 YRS) - Gardasil 9    Vaccines updated today.  Patient is two days too early to receive her final dose of the hepatitis B vaccine, but is invited to ask for this at her next clinic visit.      Reviewed options for helping to regulate menstrual bleeding.  Patient does not wish to consider use of an oral contraceptive pill nor vaginal ring.  Has thought about the IUD previously, but isn't ready to commit to that right now.  She did do well with Depo Provera in the past  Patient Instructions   Your urine pregnancy test was negative today.     You received a Depo Provera injection, which I believe will help to relieve the irregular vaginal bleeding you have been noticing for the past few weeks. You would be due to receive the next dose of Depo Provera between March 24 - April 7 for continued birth control effectiveness.     We also administered your first dose of HPV vaccine .  You will be due for your second dose of that on or after 2/3/22.    Your would be able to get your third and final dose of the hepatitis B vaccine after 1/8/22.      Return in 11 weeks (on 3/24/2022) for Follow up for next Depo Provera injection.     27 minutes spent on the date of the encounter doing chart review, history and exam, documentation and further activities per the note    Subjective   Noelle Johnson is a 26 year old year old female who presents with the following concerns:     Persistent vaginal bleeding - patient presents with concern regarding persistent vaginal bleeding noted for the past 3 weeks.  She has noted irregular menstrual bleeding previously,  as well.  She notes that she had Nexplanon placed in 2021 and removed in July of that year due to persistent irregular  Bleeding for months.  She had considered paraguard IUD and was going to pursue that option but then changed her mind and instead decided on the Depo Provera injection which she is noted to have received on 21.  She was due to receive her next Depo Provera injection between Oct 11-.  It appears that she had presented for this to the [facility] Oct 19, but I'm unable to confirm that she received Depo injection at that visit.  AVS from that visit suggests that she was to return to receive the injection on 10/31. Patient is not certain, but thinks that she had gotten a depo shot at the end of October.     After receiving depo in August she notes that she had no  Vaginal bleeding at all until approximately 3 weeks ago.  Since then she has had variable amounts of vaginal bleeding, most often fairly light.  She notes no significant associated abdominal cramping.  She is not taking any over-the-counter NSAIDs.  She finds her self quite frustrated by the persistence of the bleeding that she is having.  She would like to consider any treatment options that would prove most effective in bringing about resolution of the bleeding.      ROS: She does not believe that there is any possibility of pregnancy.  She does not use tobacco.  No personal or family history of blood clots. No family history of breast cancer.  No personal history of hypertension or liver disease.    Patient Active Problem List   Diagnosis     Bilateral thoracic back pain, unspecified chronicity     Past Surgical History:   Procedure Laterality Date      SECTION         Social History     Tobacco Use     Smoking status: Never Smoker     Smokeless tobacco: Never Used   Substance Use Topics     Alcohol use: Never     History reviewed. No pertinent family history.      No current outpatient medications on file.     No  Known Allergies    ROS:   Negative except as noted above in HPI.     Objective  /60 (BP Location: Left arm, Patient Position: Sitting, Cuff Size: Adult Regular)   Pulse 81   Wt 54.9 kg (121 lb)   LMP 12/16/2021 (Approximate)   SpO2 98%   Breastfeeding No   BMI 22.86 kg/m       General: Alert, no acute distress.   Affect: pleasant, cooperative.     Component      Latest Ref Rng & Units 1/6/2022   HCG Qual Urine      Negative Negative       Bill Naylor MD   Family medicine physician  [facility]

## 2022-01-13 ENCOUNTER — ALLIED HEALTH/NURSE VISIT (OUTPATIENT)
Dept: INTERNAL MEDICINE | Facility: CLINIC | Age: 27
End: 2022-01-13
Payer: COMMERCIAL

## 2022-01-13 DIAGNOSIS — Z23 NEED FOR VACCINATION: Primary | ICD-10-CM

## 2022-01-13 PROCEDURE — 90471 IMMUNIZATION ADMIN: CPT

## 2022-01-13 PROCEDURE — 99207 PR NO CHARGE NURSE ONLY: CPT

## 2022-01-13 PROCEDURE — 90746 HEPB VACCINE 3 DOSE ADULT IM: CPT

## 2022-01-13 NOTE — PATIENT INSTRUCTIONS
Your urine pregnancy test was negative today.     You received a Depo Provera injection, which I believe will help to relieve the irregular vaginal bleeding you have been noticing for the past few weeks. You would be due to receive the next dose of Depo Provera between March 24 - April 7 for continued birth control effectiveness.     We also administered your first dose of HPV vaccine .  You will be due for your second dose of that on or after 2/3/22.    Your would be able to get your third and final dose of the hepatitis B vaccine after 1/8/22.

## 2022-01-24 ENCOUNTER — OFFICE VISIT (OUTPATIENT)
Dept: INTERNAL MEDICINE | Facility: CLINIC | Age: 27
End: 2022-01-24
Payer: COMMERCIAL

## 2022-01-24 VITALS
SYSTOLIC BLOOD PRESSURE: 116 MMHG | BODY MASS INDEX: 23.79 KG/M2 | OXYGEN SATURATION: 100 % | DIASTOLIC BLOOD PRESSURE: 77 MMHG | HEIGHT: 61 IN | WEIGHT: 126 LBS | TEMPERATURE: 98.1 F | RESPIRATION RATE: 20 BRPM | HEART RATE: 98 BPM

## 2022-01-24 DIAGNOSIS — Z13.220 SCREENING FOR HYPERLIPIDEMIA: ICD-10-CM

## 2022-01-24 DIAGNOSIS — Z00.00 ROUTINE GENERAL MEDICAL EXAMINATION AT A HEALTH CARE FACILITY: Primary | ICD-10-CM

## 2022-01-24 DIAGNOSIS — Z23 HIGH PRIORITY FOR 2019-NCOV VACCINE: ICD-10-CM

## 2022-01-24 DIAGNOSIS — Z23 NEED FOR COVID-19 VACCINE: ICD-10-CM

## 2022-01-24 PROCEDURE — 0054A COVID-19,PF,PFIZER (12+ YRS): CPT | Performed by: INTERNAL MEDICINE

## 2022-01-24 PROCEDURE — 91305 COVID-19,PF,PFIZER (12+ YRS): CPT | Performed by: INTERNAL MEDICINE

## 2022-01-24 PROCEDURE — 99395 PREV VISIT EST AGE 18-39: CPT | Performed by: INTERNAL MEDICINE

## 2022-01-24 ASSESSMENT — ENCOUNTER SYMPTOMS
CONSTIPATION: 0
DIARRHEA: 0
FEVER: 0
SORE THROAT: 0
HEMATURIA: 0
EYE PAIN: 0
WEAKNESS: 0
DIZZINESS: 0
SHORTNESS OF BREATH: 0
CHILLS: 0
NAUSEA: 0
MYALGIAS: 0
FREQUENCY: 0
HEADACHES: 0
JOINT SWELLING: 0
HEMATOCHEZIA: 0
DYSURIA: 0
ARTHRALGIAS: 0
BREAST MASS: 0
PARESTHESIAS: 0
PALPITATIONS: 0
NERVOUS/ANXIOUS: 0
HEARTBURN: 0
COUGH: 0
ABDOMINAL PAIN: 0

## 2022-01-24 ASSESSMENT — MIFFLIN-ST. JEOR: SCORE: 1248.91

## 2022-01-24 ASSESSMENT — PATIENT HEALTH QUESTIONNAIRE - PHQ9
SUM OF ALL RESPONSES TO PHQ QUESTIONS 1-9: 0
SUM OF ALL RESPONSES TO PHQ QUESTIONS 1-9: 0

## 2022-01-24 NOTE — PROGRESS NOTES
SUBJECTIVE:   CC: Noelle Johnson is an 26 year old woman who presents for preventive health visit.       Patient has been advised of split billing requirements and indicates understanding: Yes  Patient is a 26-year-old female who presents to the clinic for her annual physical.  Patient has no acute concerns or complaints.  She reports a stable appetite.  She is stooling and voiding without issue.  Patient is sleeping well throughout the night.  She does not currently take any medications.  She did have a pelvic exam completed through her OB/GYN in 2020, and patient states that she has never had an abnormal Pap smear.  She would like to receive her Covid booster today.  Patient does report that she has received the influenza vaccination in September 2021.  She is not fasting at this time.    Healthy Habits:     Getting at least 3 servings of Calcium per day:  Yes    Bi-annual eye exam:  NO    Dental care twice a year:  Yes    Sleep apnea or symptoms of sleep apnea:  None    Diet:  Regular (no restrictions)    Frequency of exercise:  1 day/week    Duration of exercise:  15-30 minutes    Taking medications regularly:  Yes    Medication side effects:  Not applicable    PHQ-2 Total Score: 0    Additional concerns today:  No    Ability to successfully perform activities of daily living: Yes, no assistance needed  Home safety:  none identified   Hearing impairment: No            Today's PHQ-2 Score:   PHQ-2 ( 1999 Pfizer) 1/24/2022   Q1: Little interest or pleasure in doing things 0   Q2: Feeling down, depressed or hopeless 0   PHQ-2 Score 0   Q1: Little interest or pleasure in doing things Not at all   Q2: Feeling down, depressed or hopeless Not at all   PHQ-2 Score 0       Abuse: Current or Past (Physical, Sexual or Emotional) - No  Do you feel safe in your environment? Yes    Have you ever done Advance Care Planning? (For example, a Health Directive, POLST, or a discussion with a medical provider or your loved ones  about your wishes): No, advance care planning information given to patient to review.  Advanced care planning was discussed at today's visit.    Social History     Tobacco Use     Smoking status: Never Smoker     Smokeless tobacco: Never Used   Substance Use Topics     Alcohol use: Never         Alcohol Use 1/24/2022   Prescreen: >3 drinks/day or >7 drinks/week? Not Applicable       Reviewed orders with patient.  Reviewed health maintenance and updated orders accordingly - Yes  Lab work is in process    Breast Cancer Screening:    Breast CA Risk Assessment (FHS-7) 1/24/2022   Do you have a family history of breast, colon, or ovarian cancer? No / Unknown         Patient under 40 years of age: Routine Mammogram Screening not recommended.   Pertinent mammograms are reviewed under the imaging tab.    History of abnormal Pap smear: NO - age 21-29 PAP every 3 years recommended     Reviewed and updated as needed this visit by clinical staff   Allergies  Meds             Reviewed and updated as needed this visit by Provider                    Review of Systems   Constitutional: Negative for chills and fever.   HENT: Negative for congestion, ear pain, hearing loss and sore throat.    Eyes: Negative for pain and visual disturbance.   Respiratory: Negative for cough and shortness of breath.    Cardiovascular: Negative for chest pain, palpitations and peripheral edema.   Gastrointestinal: Negative for abdominal pain, constipation, diarrhea, heartburn, hematochezia and nausea.   Breasts:  Negative for tenderness, breast mass and discharge.   Genitourinary: Negative for dysuria, frequency, genital sores, hematuria, pelvic pain, urgency, vaginal bleeding and vaginal discharge.   Musculoskeletal: Negative for arthralgias, joint swelling and myalgias.   Skin: Negative for rash.   Neurological: Negative for dizziness, weakness, headaches and paresthesias.   Psychiatric/Behavioral: Negative for mood changes. The patient is not  nervous/anxious.           OBJECTIVE:   Physical Exam  Vitals and nursing note reviewed.   Constitutional:       Appearance: Normal appearance.   HENT:      Head: Normocephalic and atraumatic.      Right Ear: Tympanic membrane, ear canal and external ear normal.      Left Ear: Tympanic membrane, ear canal and external ear normal.      Mouth/Throat:      Mouth: Mucous membranes are moist.      Pharynx: Oropharynx is clear.   Eyes:      Extraocular Movements: Extraocular movements intact.      Conjunctiva/sclera: Conjunctivae normal.      Pupils: Pupils are equal, round, and reactive to light.   Cardiovascular:      Rate and Rhythm: Normal rate and regular rhythm.      Pulses: Normal pulses.      Heart sounds: Normal heart sounds.   Pulmonary:      Effort: Pulmonary effort is normal.      Breath sounds: Normal breath sounds.   Abdominal:      General: Abdomen is flat. Bowel sounds are normal.      Palpations: Abdomen is soft.   Musculoskeletal:         General: Normal range of motion.   Skin:     General: Skin is warm.      Capillary Refill: Capillary refill takes less than 2 seconds.   Neurological:      General: No focal deficit present.      Mental Status: She is alert and oriented to person, place, and time. Mental status is at baseline.         Diagnostic Test Results: Future lab orders for BMP, CBC, and fasting lipid panel placed.    ASSESSMENT/PLAN:   (Z00.00) Routine general medical examination at a health care facility  (primary encounter diagnosis)  Comment: At this time, patient does have an unremarkable physical emanation.  Her blood pressure is noted to be at an acceptable level.  We did spend some time discussing appropriate dietary and lifestyle modifications necessary to help keep her weight and blood pressure under good control.  Patient has received her pelvic examinations through her OB/GYN's office.  Influenza and tetanus immunizations are reportedly up-to-date.  Patient is not fasting today, but  "she will return at a later time to submit a fasting blood sample    (Z13.220) Screening for hyperlipidemia  Comment: Lipid panel pending.    (Z23) Need for COVID-19 vaccine  Comment: Covid booster administered today.    Patient has been advised of split billing requirements and indicates understanding: Yes    COUNSELING:  Reviewed preventive health counseling, as reflected in patient instructions    Estimated body mass index is 22.86 kg/m  as calculated from the following:    Height as of 7/5/21: 1.549 m (5' 1\").    Weight as of 1/6/22: 54.9 kg (121 lb).        She reports that she has never smoked. She has never used smokeless tobacco.      Counseling Resources:  ATP IV Guidelines  Pooled Cohorts Equation Calculator  Breast Cancer Risk Calculator  BRCA-Related Cancer Risk Assessment: FHS-7 Tool  FRAX Risk Assessment  ICSI Preventive Guidelines  Dietary Guidelines for Americans, 2010  USDA's MyPlate  ASA Prophylaxis  Lung CA Screening    Lars Barrera MD  [facility]  Answers for HPI/ROS submitted by the patient on 1/24/2022  PHQ9 TOTAL SCORE: 0      Answers for HPI/ROS submitted by the patient on 1/24/2022  PHQ9 TOTAL SCORE: 0      Answers for HPI/ROS submitted by the patient on 1/24/2022  PHQ9 TOTAL SCORE: 0      "

## 2022-01-25 ASSESSMENT — PATIENT HEALTH QUESTIONNAIRE - PHQ9: SUM OF ALL RESPONSES TO PHQ QUESTIONS 1-9: 0

## 2022-02-21 PROBLEM — M54.6 BILATERAL THORACIC BACK PAIN, UNSPECIFIED CHRONICITY: Status: RESOLVED | Noted: 2021-12-23 | Resolved: 2022-02-21

## 2022-02-21 NOTE — PROGRESS NOTES
DISCHARGE SUMMARY    Noelle Johnson was seen 2 times for evaluation and treatment.  Patient did not return for further treatment and current status is unknown.  Due to short treatment duration, no objective or functional changes were made.  Please see goal flow sheet from episode noted date below and initial evaluation for further information.  Patient is discharged from therapy and therapy episode is resolved as of 02/21/22.      Linked Episodes   Type: Episode: Status: Noted: Resolved: Last update: Updated by:   PHYSICAL THERAPY Upper Back 12/23/2021 Active 12/23/2021 1/6/2022  7:02 AM Dexter Curran      Comments:

## 2022-02-28 ENCOUNTER — OFFICE VISIT (OUTPATIENT)
Dept: INTERNAL MEDICINE | Facility: CLINIC | Age: 27
End: 2022-02-28
Payer: COMMERCIAL

## 2022-02-28 VITALS
WEIGHT: 126.5 LBS | TEMPERATURE: 98 F | HEART RATE: 93 BPM | OXYGEN SATURATION: 99 % | BODY MASS INDEX: 23.88 KG/M2 | SYSTOLIC BLOOD PRESSURE: 121 MMHG | RESPIRATION RATE: 18 BRPM | HEIGHT: 61 IN | DIASTOLIC BLOOD PRESSURE: 69 MMHG

## 2022-02-28 DIAGNOSIS — B37.31 YEAST INFECTION OF THE VAGINA: Primary | ICD-10-CM

## 2022-02-28 DIAGNOSIS — N89.8 VAGINAL DISCHARGE: ICD-10-CM

## 2022-02-28 DIAGNOSIS — L29.2 VULVAR ITCHING: ICD-10-CM

## 2022-02-28 LAB
CLUE CELLS: ABNORMAL
TRICHOMONAS, WET PREP: ABNORMAL
WBC'S/HIGH POWER FIELD, WET PREP: ABNORMAL
YEAST, WET PREP: PRESENT

## 2022-02-28 PROCEDURE — 87591 N.GONORRHOEAE DNA AMP PROB: CPT | Performed by: PHYSICIAN ASSISTANT

## 2022-02-28 PROCEDURE — 99213 OFFICE O/P EST LOW 20 MIN: CPT | Performed by: PHYSICIAN ASSISTANT

## 2022-02-28 PROCEDURE — 87210 SMEAR WET MOUNT SALINE/INK: CPT | Performed by: PHYSICIAN ASSISTANT

## 2022-02-28 PROCEDURE — 87491 CHLMYD TRACH DNA AMP PROBE: CPT | Performed by: PHYSICIAN ASSISTANT

## 2022-02-28 RX ORDER — FLUCONAZOLE 150 MG/1
150 TABLET ORAL ONCE
Qty: 2 TABLET | Refills: 0 | Status: SHIPPED | OUTPATIENT
Start: 2022-02-28 | End: 2022-04-01

## 2022-02-28 NOTE — PROGRESS NOTES
"  Assessment & Plan     Yeast infection of the vagina  Yeast present on wet prep. Diflucan sent to pharmacy.    Vulvar itching  See above  - Wet prep - Clinic Collect    Vaginal discharge  Agrees to GC/CT testing today  - NEISSERIA GONORRHOEA PCR  - CHLAMYDIA TRACHOMATIS PCR    Ordering of each unique test  Prescription drug management  25 minutes spent on the date of the encounter doing chart review, history and exam, documentation and further activities per the note      No follow-ups on file.    JEWELS Jones [facility] WENDY Drake is a 26 year old who presents for the following health issues   Patient is being seen for an vaginal burning sensation and discharge.  HPI     Vaginal symptoms:  Symptoms started 2 weeks ago  Some improvement with Monistat, but she stopped it and symptoms came back  Used Monistat x2 doses, approx 5 days ago    Discharge is now yellow. Was white before.  \"Sharp\" odor, like urine    Was having itching  Wiping the area is uncomfortable  External symptoms  No recent antibiotics    4 years with current partner    On Depo for contraception  Last injection 1/6/22    Denies hematuria, abdominal/pelvic pain    Review of Systems   Constitutional, HEENT, cardiovascular, pulmonary, gi and gu systems are negative, except as otherwise noted.      Objective    /69   Pulse 93   Temp 98  F (36.7  C) (Tympanic)   Resp 18   Ht 1.549 m (5' 1\")   Wt 57.4 kg (126 lb 8 oz)   SpO2 99%   BMI 23.90 kg/m    Body mass index is 23.9 kg/m .  Physical Exam   GENERAL: healthy, alert and no distress   (female): normal female external genitalia, normal urethral meatus , vaginal mucosa pink, moist, well rugated and vaginal discharge - white and yellow  SKIN: no suspicious lesions or rashes  PSYCH: mentation appears normal, affect normal/bright    Results for orders placed or performed in visit on 02/28/22 (from the past 24 hour(s))   Wet prep - Clinic Collect "    Specimen: Vagina; Swab   Result Value Ref Range    Trichomonas Absent Absent    Yeast Present (A) Absent    Clue Cells Absent Absent    WBCs/high power field 2+ (A) None

## 2022-03-01 LAB
C TRACH DNA SPEC QL NAA+PROBE: NEGATIVE
N GONORRHOEA DNA SPEC QL NAA+PROBE: NEGATIVE

## 2022-04-01 ENCOUNTER — OFFICE VISIT (OUTPATIENT)
Dept: INTERNAL MEDICINE | Facility: CLINIC | Age: 27
End: 2022-04-01
Payer: COMMERCIAL

## 2022-04-01 VITALS
OXYGEN SATURATION: 98 % | BODY MASS INDEX: 23.6 KG/M2 | SYSTOLIC BLOOD PRESSURE: 126 MMHG | TEMPERATURE: 97.9 F | WEIGHT: 125 LBS | HEIGHT: 61 IN | RESPIRATION RATE: 14 BRPM | HEART RATE: 93 BPM | DIASTOLIC BLOOD PRESSURE: 62 MMHG

## 2022-04-01 DIAGNOSIS — H66.92 ACUTE LEFT OTITIS MEDIA: Primary | ICD-10-CM

## 2022-04-01 DIAGNOSIS — B37.31 YEAST INFECTION OF THE VAGINA: ICD-10-CM

## 2022-04-01 PROCEDURE — 99213 OFFICE O/P EST LOW 20 MIN: CPT | Performed by: INTERNAL MEDICINE

## 2022-04-01 RX ORDER — MEDROXYPROGESTERONE ACETATE 150 MG/ML
150 INJECTION, SUSPENSION INTRAMUSCULAR
COMMUNITY
End: 2023-02-06

## 2022-04-01 RX ORDER — FLUCONAZOLE 150 MG/1
150 TABLET ORAL ONCE
Qty: 2 TABLET | Refills: 0 | Status: SHIPPED | OUTPATIENT
Start: 2022-04-01 | End: 2022-04-01

## 2022-04-01 NOTE — PROGRESS NOTES
"  Assessment & Plan   (H66.92) Acute left otitis media  (primary encounter diagnosis)  Comment: Will treat with Augmentin.   Plan: amoxicillin-clavulanate (AUGMENTIN) 875-125 MG         tablet          (B37.3) Yeast infection of the vagina  Comment: She has a h/o candida vaginitis and is offered an Rx for fluconazole in case symptoms should recur on antibiotics.   Plan: fluconazole (DIFLUCAN) 150 MG tablet               Patient Instructions   After removing the wax, your left eardrum has a tiny bit of redness.    Let's treat you with an antibiotic in case this is an early ear infection.   If you develop symptoms of a yeast infection again, I've also sent a prescription for this as well.     See us back in about 10 months or so for your physical exam (January 2023)      Return in about 10 months (around 2/1/2023).    Lucius Ibarra MD,   [facility]    Ramon Drake is a 26 year old who presents for the following health issues : pain and swelling behind left ear.    HPI     About 4 days ago she first began noting some discomfort behind her left lower ear, particularly when lying on her left side.  Pain seemed to come and go until about 3 PM yesterday, after which time it is remained rather constant.  She does have a history of ear infections.  No fevers or chills.  No sinus pain or sore throat.  No cough or shortness of breath.    Past medical, family and social histories as well as medications reviewed and updated as needed.    Review of Systems   REVIEW OF SYSTEMS: The following systems have been completely reviewed and are negative except as noted above:   Constitutional, HEENT, respiratory, cardiovascular systems.        Objective    /62   Pulse 93   Temp 97.9  F (36.6  C) (Tympanic)   Resp 14   Ht 1.549 m (5' 1\")   Wt 56.7 kg (125 lb)   LMP  (LMP Unknown)   SpO2 98%   Breastfeeding No   BMI 23.62 kg/m    Body mass index is 23.62 kg/m .  Physical Exam   GENERAL: " healthy, alert and no distress  EYES: Eyes grossly normal to inspection, PERRL and conjunctivae and sclerae normal  HENT: normal cephalic/atraumatic, right ear: normal: no effusions, no erythema, normal landmarks, left ear: wax initially occludes canal, removed by MD with curette. TM appears very slightly red with slightly diminished light reflex.   Nose and mouth without ulcers or lesions, oropharynx clear and oral mucous membranes moist  NECK: no adenopathy, no asymmetry, masses, or scars and thyroid normal to palpation  RESP: lungs clear to auscultation - no rales, rhonchi or wheezes  CV: regular rate and rhythm, normal S1 S2, no S3 or S4, no murmur, click or rub, no peripheral edema and peripheral pulses strong  MS: no gross musculoskeletal defects noted, no edema  NEURO: Normal strength and tone, mentation intact and speech normal  PSYCH: mentation appears normal, affect normal/bright

## 2022-04-01 NOTE — PATIENT INSTRUCTIONS
After removing the wax, your left eardrum has a tiny bit of redness.    Let's treat you with an antibiotic in case this is an early ear infection.   If you develop symptoms of a yeast infection again, I've also sent a prescription for this as well.     See us back in about 10 months or so for your physical exam (January 2023)

## 2022-04-25 ENCOUNTER — ALLIED HEALTH/NURSE VISIT (OUTPATIENT)
Dept: INTERNAL MEDICINE | Facility: CLINIC | Age: 27
End: 2022-04-25
Payer: COMMERCIAL

## 2022-04-25 DIAGNOSIS — Z30.9 CONTRACEPTIVE MANAGEMENT: Primary | ICD-10-CM

## 2022-04-25 PROCEDURE — 96372 THER/PROPH/DIAG INJ SC/IM: CPT | Performed by: ADVANCED PRACTICE MIDWIFE

## 2022-04-25 PROCEDURE — 99207 PR NO CHARGE NURSE ONLY: CPT

## 2022-04-25 RX ADMIN — MEDROXYPROGESTERONE ACETATE 150 MG: 150 INJECTION, SUSPENSION INTRAMUSCULAR at 14:47

## 2022-04-25 NOTE — PROGRESS NOTES
Patient advised she should bring a copy of her last pap report to her July Depo appointment or scheduled a pap.

## 2022-04-27 ENCOUNTER — OFFICE VISIT (OUTPATIENT)
Dept: INTERNAL MEDICINE | Facility: CLINIC | Age: 27
End: 2022-04-27
Payer: COMMERCIAL

## 2022-04-27 VITALS
OXYGEN SATURATION: 100 % | WEIGHT: 124 LBS | BODY MASS INDEX: 23.41 KG/M2 | HEIGHT: 61 IN | TEMPERATURE: 98.7 F | DIASTOLIC BLOOD PRESSURE: 93 MMHG | RESPIRATION RATE: 20 BRPM | SYSTOLIC BLOOD PRESSURE: 136 MMHG | HEART RATE: 104 BPM

## 2022-04-27 DIAGNOSIS — M25.522 LEFT ELBOW PAIN: Primary | ICD-10-CM

## 2022-04-27 PROCEDURE — 99214 OFFICE O/P EST MOD 30 MIN: CPT | Performed by: INTERNAL MEDICINE

## 2022-04-27 ASSESSMENT — ENCOUNTER SYMPTOMS
ARTHRALGIAS: 1
CARDIOVASCULAR NEGATIVE: 1
GASTROINTESTINAL NEGATIVE: 1
WEAKNESS: 1
CONSTITUTIONAL NEGATIVE: 1
RESPIRATORY NEGATIVE: 1

## 2022-04-27 NOTE — PROGRESS NOTES
Assessment & Plan     Left elbow pain  At this time, we did elect to proceed with x-ray imaging of the elbow to evaluate for any potential bony abnormalities.  Per my read, no acute osseous abnormalities were appreciated on x-ray imaging of her left elbow.  I suspect the patient may have suffered a contusion involving her left elbow.  Patient was placed in a sling for support.  She was encouraged to continue frequently icing the affected joint throughout the course of the day.  We also discussed use of anti-inflammatories for management of her discomfort.  Patient had no further questions or concerns at this time.      Ordering of each unique test  30 minutes spent on the date of the encounter doing chart review, history and exam, documentation and further activities per the note       See Patient Instructions    Return in about 2 weeks (around 5/11/2022), or if symptoms worsen or fail to improve.    Lars Barrera MD  [facility] WENDY Drake is a 26 year old who presents for the following health issues: fall, complains of left  elbow pain.    Patient is a 26-year-old female who presents to the clinic with chief complaint of elbow pain.  Patient states on the morning of presentation she was cleaning at home when she fell.  Patient did fall to her left side, and she did land directly on her bent elbow.  Patient reports that since the injury did occur she has had issues with noticeable pain and difficulties with flexing her left arm.  Patient states that the pain is most pronounced over the posterior aspect of her left elbow.  She is noted to be right-hand-dominant.  Patient has noted a small amount of swelling around the olecranon process.  She has been icing the affected extremity, but she has not taken any medication for management of her discomfort.       Review of Systems   Constitutional: Negative.    HENT: Negative.    Respiratory: Negative.    Cardiovascular:  "Negative.    Gastrointestinal: Negative.    Musculoskeletal: Positive for arthralgias.   Neurological: Positive for weakness.            Objective    LMP  (LMP Unknown)    Blood pressure (!) 136/93, pulse 104, temperature 98.7  F (37.1  C), resp. rate 20, height 1.549 m (5' 1\"), weight 56.2 kg (124 lb), SpO2 100 %, not currently breastfeeding.      Physical Exam  Vitals and nursing note reviewed.   Constitutional:       Appearance: Normal appearance.   HENT:      Head: Normocephalic and atraumatic.      Right Ear: Tympanic membrane, ear canal and external ear normal.      Left Ear: Tympanic membrane, ear canal and external ear normal.      Mouth/Throat:      Mouth: Mucous membranes are moist.      Pharynx: Oropharynx is clear.   Cardiovascular:      Rate and Rhythm: Regular rhythm. Tachycardia present.      Pulses: Normal pulses.      Heart sounds: Normal heart sounds.   Pulmonary:      Effort: Pulmonary effort is normal.      Breath sounds: Normal breath sounds.   Musculoskeletal:      Right elbow: Normal.      Left elbow: Swelling present. Decreased range of motion. Tenderness present in lateral epicondyle and olecranon process.   Skin:     General: Skin is warm.      Capillary Refill: Capillary refill takes less than 2 seconds.   Neurological:      Mental Status: She is alert.        Diagnostic Test Results: X-ray of left elbow, 3 views.  No acute osseous abnormality noted.        "

## 2022-04-27 NOTE — PATIENT INSTRUCTIONS
X-ray imaging showed no signs of fracture or dislocation.  Julián conservative treatment for elbow contusion.

## 2022-08-04 ENCOUNTER — OFFICE VISIT (OUTPATIENT)
Dept: INTERNAL MEDICINE | Facility: CLINIC | Age: 27
End: 2022-08-04
Payer: COMMERCIAL

## 2022-08-04 VITALS
DIASTOLIC BLOOD PRESSURE: 50 MMHG | HEART RATE: 89 BPM | TEMPERATURE: 98.5 F | WEIGHT: 123.8 LBS | BODY MASS INDEX: 23.39 KG/M2 | RESPIRATION RATE: 20 BRPM | OXYGEN SATURATION: 99 % | SYSTOLIC BLOOD PRESSURE: 98 MMHG

## 2022-08-04 DIAGNOSIS — Z12.4 CERVICAL CANCER SCREENING: ICD-10-CM

## 2022-08-04 DIAGNOSIS — Z30.9 ENCOUNTER FOR CONTRACEPTIVE MANAGEMENT, UNSPECIFIED TYPE: Primary | ICD-10-CM

## 2022-08-04 LAB — HCG UR QL: NEGATIVE

## 2022-08-04 PROCEDURE — 90651 9VHPV VACCINE 2/3 DOSE IM: CPT | Performed by: INTERNAL MEDICINE

## 2022-08-04 PROCEDURE — 99213 OFFICE O/P EST LOW 20 MIN: CPT | Mod: 25 | Performed by: INTERNAL MEDICINE

## 2022-08-04 PROCEDURE — 90471 IMMUNIZATION ADMIN: CPT | Performed by: INTERNAL MEDICINE

## 2022-08-04 PROCEDURE — G0145 SCR C/V CYTO,THINLAYER,RESCR: HCPCS | Performed by: INTERNAL MEDICINE

## 2022-08-04 PROCEDURE — 96372 THER/PROPH/DIAG INJ SC/IM: CPT | Performed by: ADVANCED PRACTICE MIDWIFE

## 2022-08-04 PROCEDURE — 81025 URINE PREGNANCY TEST: CPT | Performed by: INTERNAL MEDICINE

## 2022-08-04 RX ORDER — NORGESTIMATE AND ETHINYL ESTRADIOL 0.25-0.035
1 KIT ORAL DAILY
COMMUNITY
Start: 2022-07-15 | End: 2023-02-06

## 2022-08-04 RX ADMIN — MEDROXYPROGESTERONE ACETATE 150 MG: 150 INJECTION, SUSPENSION INTRAMUSCULAR at 18:22

## 2022-08-04 NOTE — PROGRESS NOTES
Assessment & Plan   (Z30.9) Encounter for contraceptive management, unspecified type  (primary encounter diagnosis)  Comment: IM Depo-Provera given.  Plan: HCG qualitative urine, CANCELED: HCG         qualitative urine, CANCELED: HCG qualitative         urine          (Z12.4) Cervical cancer screening  Comment: Pap smear obtained.  Plan: Pap Screen reflex to HPV if ASCUS - recommend         age 25 - 29               Patient Instructions   Everything looks fine!    Please schedule a Depo-provera shot in three months.       Return in about 3 months (around 11/4/2022) for Depo-provera injection..    Lucius Ibarra MD,   [facility] is a 26 [facility] old woman, presenting for the following health issues:  Gyn Exam (PAP and depo)      History of Present Illness       Reason for visit:  Pap smear and depo shot    She eats 2-3 servings of fruits and vegetables daily.She consumes 1 sweetened beverage(s) daily.She exercises with enough effort to increase her heart rate 20 to 29 minutes per day.  She exercises with enough effort to increase her heart rate 5 days per week.   She is taking medications regularly.       The patient denies any illness symptoms or additional concerns.     Past medical, family and social histories as well as medications reviewed and updated as needed.    Review of Systems   REVIEW OF SYSTEMS: The following systems have been completely reviewed and are negative except as noted above:   Constitutional, respiratory, cardiovascular, and neurologic systems.         Objective    BP 98/50 (BP Location: Left arm, Patient Position: Sitting, Cuff Size: Adult Regular)   Pulse 89   Temp 98.5  F (36.9  C) (Oral)   Resp 20   Wt 56.2 kg (123 lb 12.8 oz)   SpO2 99%   BMI 23.39 kg/m    Body mass index is 23.39 kg/m .     Physical Exam   GENERAL: healthy, alert and no distress  NECK: no adenopathy, no asymmetry, masses, or scars and thyroid normal to palpation  RESP:  lungs clear to auscultation - no rales, rhonchi or wheezes  BREAST: not performed  CV: regular rate and rhythm, normal S1 S2, no S3 or S4, no murmur, click or rub, no peripheral edema and peripheral pulses strong  ABDOMEN: soft, nontender, no hepatosplenomegaly, no masses and bowel sounds normal   (female): normal female external genitalia, normal urethral meatus, vaginal mucosa, normal cervix/adnexa/uterus without masses or discharge  MS: no gross musculoskeletal defects noted, no edema  PSYCH: mentation appears normal, affect normal/bright

## 2022-08-09 LAB
BKR LAB AP GYN ADEQUACY: NORMAL
BKR LAB AP GYN INTERPRETATION: NORMAL
BKR LAB AP HPV REFLEX: NORMAL
BKR LAB AP PREVIOUS ABNORMAL: NORMAL
PATH REPORT.COMMENTS IMP SPEC: NORMAL
PATH REPORT.COMMENTS IMP SPEC: NORMAL
PATH REPORT.RELEVANT HX SPEC: NORMAL

## 2022-10-03 ENCOUNTER — HEALTH MAINTENANCE LETTER (OUTPATIENT)
Age: 27
End: 2022-10-03

## 2022-10-12 ENCOUNTER — IMMUNIZATION (OUTPATIENT)
Dept: INTERNAL MEDICINE | Facility: CLINIC | Age: 27
End: 2022-10-12
Payer: COMMERCIAL

## 2022-10-12 DIAGNOSIS — Z23 NEED FOR PROPHYLACTIC VACCINATION AND INOCULATION AGAINST INFLUENZA: Primary | ICD-10-CM

## 2022-10-12 PROCEDURE — 90686 IIV4 VACC NO PRSV 0.5 ML IM: CPT

## 2022-10-12 PROCEDURE — 99207 PR NO CHARGE NURSE ONLY: CPT

## 2022-10-12 PROCEDURE — 90471 IMMUNIZATION ADMIN: CPT

## 2023-02-06 ENCOUNTER — OFFICE VISIT (OUTPATIENT)
Dept: INTERNAL MEDICINE | Facility: CLINIC | Age: 28
End: 2023-02-06
Payer: COMMERCIAL

## 2023-02-06 VITALS
HEART RATE: 90 BPM | WEIGHT: 116 LBS | BODY MASS INDEX: 21.35 KG/M2 | DIASTOLIC BLOOD PRESSURE: 60 MMHG | RESPIRATION RATE: 16 BRPM | HEIGHT: 62 IN | SYSTOLIC BLOOD PRESSURE: 96 MMHG | OXYGEN SATURATION: 100 % | TEMPERATURE: 98.2 F

## 2023-02-06 DIAGNOSIS — Z00.00 ENCOUNTER FOR PREVENTATIVE ADULT HEALTH CARE EXAMINATION: Primary | ICD-10-CM

## 2023-02-06 DIAGNOSIS — Z30.9 ENCOUNTER FOR CONTRACEPTIVE MANAGEMENT, UNSPECIFIED TYPE: ICD-10-CM

## 2023-02-06 PROCEDURE — 99395 PREV VISIT EST AGE 18-39: CPT

## 2023-02-06 RX ORDER — NORELGESTROMIN AND ETHINYL ESTRADIOL 35; 150 UG/MG; UG/MG
PATCH TRANSDERMAL
Qty: 9 PATCH | Refills: 3 | Status: SHIPPED | OUTPATIENT
Start: 2023-02-06 | End: 2023-11-10

## 2023-02-06 RX ORDER — NORGESTIMATE AND ETHINYL ESTRADIOL 0.25-0.035
1 KIT ORAL DAILY
COMMUNITY
End: 2023-02-06

## 2023-02-06 ASSESSMENT — ENCOUNTER SYMPTOMS
CHEST TIGHTNESS: 0
NERVOUS/ANXIOUS: 0
BREAST MASS: 0
FEVER: 0
DIZZINESS: 0
SHORTNESS OF BREATH: 0
LIGHT-HEADEDNESS: 0
HEMATOCHEZIA: 0
UNEXPECTED WEIGHT CHANGE: 0
CONSTIPATION: 0
ABDOMINAL PAIN: 0
DIARRHEA: 0

## 2023-02-06 NOTE — PROGRESS NOTES
SUBJECTIVE:   CC: Noelle is an 27 year old who presents for preventive health visit.     Patient has been advised of split billing requirements and indicates understanding: Yes  Healthy Habits:     Getting at least 3 servings of Calcium per day:  Yes    Bi-annual eye exam:  NO    Dental care twice a year:  Yes    Sleep apnea or symptoms of sleep apnea:  None    Diet:  Other    Frequency of exercise:  None    Taking medications regularly:  Yes    PHQ-2 Total Score: 0    Additional concerns today:  No      Today's PHQ-2 Score:   PHQ-2 ( 1999 Pfizer) 2/6/2023   Q1: Little interest or pleasure in doing things 0   Q2: Feeling down, depressed or hopeless 0   PHQ-2 Score 0   Q1: Little interest or pleasure in doing things Not at all   Q2: Feeling down, depressed or hopeless Not at all   PHQ-2 Score 0       Social History     Tobacco Use     Smoking status: Never     Smokeless tobacco: Never   Substance Use Topics     Alcohol use: Never     If you drink alcohol do you typically have >3 drinks per day or >7 drinks per week? Yes      Alcohol Use 2/6/2023   Prescreen: >3 drinks/day or >7 drinks/week? Not Applicable   Prescreen: >3 drinks/day or >7 drinks/week? -   No flowsheet data found.    Reviewed orders with patient.  Reviewed health maintenance and updated orders accordingly - Yes    Breast Cancer Screening:    Breast CA Risk Assessment (FHS-7) 1/24/2022   Do you have a family history of breast, colon, or ovarian cancer? No / Unknown     Pertinent mammograms are reviewed under the imaging tab.    History of abnormal Pap smear: NO - age 30- 65 PAP every 3 years recommended  PAP / HPV Latest Ref Rng & Units 8/4/2022   PAP   Negative for Intraepithelial Lesion or Malignancy (NILM)     Reviewed and updated as needed this visit by clinical staff   Tobacco  Allergies    Med Hx  Surg Hx  Fam Hx  Soc Hx        Reviewed and updated as needed this visit by Provider                   Review of Systems   Constitutional:  "Negative for fever and unexpected weight change.   Respiratory: Negative for chest tightness and shortness of breath.    Cardiovascular: Negative for chest pain.   Gastrointestinal: Negative for abdominal pain, constipation, diarrhea and hematochezia.   Breasts:  Negative for tenderness and breast mass.   Neurological: Negative for dizziness and light-headedness.   Psychiatric/Behavioral: Negative for mood changes. The patient is not nervous/anxious.         OBJECTIVE:   BP 96/60   Pulse 90   Temp 98.2  F (36.8  C) (Oral)   Resp 16   Ht 1.568 m (5' 1.75\")   Wt 52.6 kg (116 lb)   LMP 01/15/2023 (Approximate)   SpO2 100%   BMI 21.39 kg/m      Physical Exam  Constitutional:       General: She is not in acute distress.     Appearance: Normal appearance. She is not ill-appearing, toxic-appearing or diaphoretic.   HENT:      Head: Normocephalic and atraumatic.   Eyes:      Conjunctiva/sclera: Conjunctivae normal.   Cardiovascular:      Rate and Rhythm: Normal rate and regular rhythm.      Heart sounds: Normal heart sounds.   Pulmonary:      Effort: Pulmonary effort is normal.      Breath sounds: Normal breath sounds.   Skin:     General: Skin is warm and dry.   Neurological:      Mental Status: She is alert and oriented to person, place, and time.   Psychiatric:         Mood and Affect: Mood normal.         Behavior: Behavior normal.         Thought Content: Thought content normal.         Judgment: Judgment normal.       Diagnostic Test Results:  Labs reviewed in Epic    ASSESSMENT/PLAN:   (Z00.00) Encounter for preventative adult health care examination  (primary encounter diagnosis)  Comment: Pt presents for annual physical.     (Z30.9) Encounter for contraceptive management, unspecified type  Plan: norelgestromin-ethinyl estradiol (ORTHO EVRA)         150-35 MCG/24HR patch            Patient has been advised of split billing requirements and indicates understanding: Yes      COUNSELING:  Reviewed preventive " health counseling, as reflected in patient instructions      She reports that she has never smoked. She has never used smokeless tobacco.          SHEEBA Flores CNP  [facility]

## 2023-02-07 ENCOUNTER — TELEPHONE (OUTPATIENT)
Dept: INTERNAL MEDICINE | Facility: CLINIC | Age: 28
End: 2023-02-07

## 2023-02-07 NOTE — TELEPHONE ENCOUNTER
Ortho Evra is not covered is there another that might be covered please. Provider mentioned that there might be, one that could be covered.

## 2023-02-08 NOTE — TELEPHONE ENCOUNTER
Please have patient call her insurance company as they will need to look this up for her to find best choice.    Please let me know if you have any questions.    Thanks!  SHEEBA Flores, CNP   [facility]

## 2023-02-08 NOTE — TELEPHONE ENCOUNTER
Call to [facility] and advised to check with insurance company for formulary options and to call back. Agrees. Will keep open for now.

## 2023-04-24 ENCOUNTER — HOSPITAL ENCOUNTER (EMERGENCY)
Facility: CLINIC | Age: 28
Discharge: HOME | End: 2023-04-24
Attending: EMERGENCY MEDICINE | Admitting: EMERGENCY MEDICINE
Payer: COMMERCIAL

## 2023-04-24 ENCOUNTER — APPOINTMENT (OUTPATIENT)
Dept: ULTRASOUND IMAGING | Facility: CLINIC | Age: 28
End: 2023-04-24
Attending: EMERGENCY MEDICINE
Payer: COMMERCIAL

## 2023-04-24 VITALS
RESPIRATION RATE: 18 BRPM | DIASTOLIC BLOOD PRESSURE: 91 MMHG | OXYGEN SATURATION: 100 % | SYSTOLIC BLOOD PRESSURE: 129 MMHG | HEART RATE: 95 BPM | TEMPERATURE: 98.4 F | WEIGHT: 115 LBS | BODY MASS INDEX: 21.2 KG/M2

## 2023-04-24 DIAGNOSIS — N93.9 ABNORMAL UTERINE BLEEDING: ICD-10-CM

## 2023-04-24 LAB
ABO/RH(D): NORMAL
ALBUMIN UR-MCNC: 100 MG/DL
ANION GAP SERPL CALCULATED.3IONS-SCNC: 12 MMOL/L (ref 7–15)
ANTIBODY SCREEN: NEGATIVE
APPEARANCE UR: ABNORMAL
BACTERIA #/AREA URNS HPF: ABNORMAL /HPF
BASOPHILS # BLD AUTO: 0 10E3/UL (ref 0–0.2)
BASOPHILS NFR BLD AUTO: 1 %
BILIRUB UR QL STRIP: NEGATIVE
BUN SERPL-MCNC: 9 MG/DL (ref 6–20)
CALCIUM SERPL-MCNC: 9.6 MG/DL (ref 8.6–10)
CHLORIDE SERPL-SCNC: 102 MMOL/L (ref 98–107)
COLOR UR AUTO: ABNORMAL
CREAT SERPL-MCNC: 0.72 MG/DL (ref 0.51–0.95)
DEPRECATED HCO3 PLAS-SCNC: 25 MMOL/L (ref 22–29)
EOSINOPHIL # BLD AUTO: 0 10E3/UL (ref 0–0.7)
EOSINOPHIL NFR BLD AUTO: 1 %
ERYTHROCYTE [DISTWIDTH] IN BLOOD BY AUTOMATED COUNT: 11.9 % (ref 10–15)
GFR SERPL CREATININE-BSD FRML MDRD: >90 ML/MIN/1.73M2
GLUCOSE SERPL-MCNC: 93 MG/DL (ref 70–99)
GLUCOSE UR STRIP-MCNC: NEGATIVE MG/DL
HCG INTACT+B SERPL-ACNC: <1 MIU/ML
HCT VFR BLD AUTO: 43.4 % (ref 35–47)
HGB BLD-MCNC: 15.1 G/DL (ref 11.7–15.7)
HGB UR QL STRIP: ABNORMAL
HOLD SPECIMEN: NORMAL
IMM GRANULOCYTES # BLD: 0 10E3/UL
IMM GRANULOCYTES NFR BLD: 0 %
KETONES UR STRIP-MCNC: NEGATIVE MG/DL
LEUKOCYTE ESTERASE UR QL STRIP: ABNORMAL
LYMPHOCYTES # BLD AUTO: 1.7 10E3/UL (ref 0.8–5.3)
LYMPHOCYTES NFR BLD AUTO: 47 %
MCH RBC QN AUTO: 30.4 PG (ref 26.5–33)
MCHC RBC AUTO-ENTMCNC: 34.8 G/DL (ref 31.5–36.5)
MCV RBC AUTO: 88 FL (ref 78–100)
MONOCYTES # BLD AUTO: 0.3 10E3/UL (ref 0–1.3)
MONOCYTES NFR BLD AUTO: 8 %
MUCOUS THREADS #/AREA URNS LPF: PRESENT /LPF
NEUTROPHILS # BLD AUTO: 1.6 10E3/UL (ref 1.6–8.3)
NEUTROPHILS NFR BLD AUTO: 43 %
NITRATE UR QL: NEGATIVE
NRBC # BLD AUTO: 0 10E3/UL
NRBC BLD AUTO-RTO: 0 /100
PH UR STRIP: 6 [PH] (ref 5–7)
PLATELET # BLD AUTO: 336 10E3/UL (ref 150–450)
POTASSIUM SERPL-SCNC: 3.6 MMOL/L (ref 3.4–5.3)
RBC # BLD AUTO: 4.96 10E6/UL (ref 3.8–5.2)
RBC URINE: >182 /HPF
SODIUM SERPL-SCNC: 139 MMOL/L (ref 136–145)
SP GR UR STRIP: 1.01 (ref 1–1.03)
SPECIMEN EXPIRATION DATE: NORMAL
SQUAMOUS EPITHELIAL: 1 /HPF
UROBILINOGEN UR STRIP-MCNC: NORMAL MG/DL
WBC # BLD AUTO: 3.6 10E3/UL (ref 4–11)
WBC URINE: 10 /HPF

## 2023-04-24 PROCEDURE — 86850 RBC ANTIBODY SCREEN: CPT | Performed by: EMERGENCY MEDICINE

## 2023-04-24 PROCEDURE — 36415 COLL VENOUS BLD VENIPUNCTURE: CPT | Performed by: EMERGENCY MEDICINE

## 2023-04-24 PROCEDURE — 81001 URINALYSIS AUTO W/SCOPE: CPT | Performed by: EMERGENCY MEDICINE

## 2023-04-24 PROCEDURE — 99285 EMERGENCY DEPT VISIT HI MDM: CPT | Mod: 25

## 2023-04-24 PROCEDURE — 80048 BASIC METABOLIC PNL TOTAL CA: CPT | Performed by: EMERGENCY MEDICINE

## 2023-04-24 PROCEDURE — 76856 US EXAM PELVIC COMPLETE: CPT

## 2023-04-24 PROCEDURE — 85025 COMPLETE CBC W/AUTO DIFF WBC: CPT | Performed by: EMERGENCY MEDICINE

## 2023-04-24 PROCEDURE — 84702 CHORIONIC GONADOTROPIN TEST: CPT | Performed by: EMERGENCY MEDICINE

## 2023-04-24 ASSESSMENT — ACTIVITIES OF DAILY LIVING (ADL)
ADLS_ACUITY_SCORE: 35
ADLS_ACUITY_SCORE: 35

## 2023-04-24 NOTE — ED NOTES
PIT/Triage Evaluation    Patient presented with vaginal bleeding.  The patient states that she finished her menstrual cycle last Tuesday.  Last night she noted spotting and then when she woke this morning there was considerable blood in her night close as well as on her bed and when she walked to the bathroom some blood was dripping up vaginally.  She went into the shower to clean off and still noted bleeding so she presented to the emergency department for further evaluation.    The patient denies any pain.    She notes that in the past she was on Nexplanon but was still bleeding so then transition to a Depo shot which seemed to work though at the end of the 3-month shot she would bleed.  Eventually she went to an urgent care due to this and was given birth control pills but since then has noticed a strong odor.  She denies dysuria.    Exam is notable for:    Cardiovascular: Normal rate, regular rhythm and normal heart sounds.    Pulmonary/Chest: Effort normal and breath sounds normal. No respiratory distress. Patient has no wheezes.   Gastrointestinal: Soft, non-tender, non-distended.    Appropriate interventions for symptom management were initiated if applicable.  Appropriate diagnostic tests were initiated if indicated.    Important information for subsequent clinician:    1.  Hemoglobin normal at the time of my evaluation.  Patient is not pregnant.  Metabolic panel appears normal.  2.  UA is pending.  3.  Ultrasound pelvis is ordered.    I briefly evaluated the patient and developed an initial plan of care. I discussed this plan and explained that this brief interaction does not constitute a full evaluation. Patient/family understands that they should wait to be fully evaluated and discuss any test results with another clinician prior to leaving the hospital.       Jim Peterson DO  04/24/23 [id number]

## 2023-04-24 NOTE — ED TRIAGE NOTES
A&O x4, ABCs intact. Pt presents with concern for vaginal bleeding that started las tnight. Pt states that last night she noted spotting. She states that today she went through a pad in the AM and then in the shower she noted she was bleeding again. Pt reports bleeding intermittent. Her menstrual cycle ended this last Tuesday. She denies pain.        Triage Assessment     Row Name 04/24/23 [id number]       Triage Assessment (Adult)    Airway WDL WDL       Respiratory WDL    Respiratory WDL WDL       Cardiac WDL    Cardiac WDL WDL

## 2023-04-25 NOTE — DISCHARGE INSTRUCTIONS
What do you do next:   Continue your home medications unless we have specifically changed them  As you noticed improvement with birth control pills in the past, it may be reasonable to consider the use or even the transdermal patches that were prescribed at a prior visit.  Follow up as indicated below    When do you return: If you have vaginal bleeding or you soak a pad edge to edge inside of an hour, lightheadedness/pain, or any other symptoms that concern you, please return to the ED for reevaluation.    Thank you for allowing us to care for you today.

## 2023-04-25 NOTE — ED PROVIDER NOTES
History     Chief Complaint:  Vaginal Bleeding       HPI   Patient presented with vaginal bleeding.  The patient states that she finished her menstrual cycle last Tuesday.  Last night she noted spotting and then when she woke this morning there was considerable blood in her night close as well as on her bed and when she walked to the bathroom some blood was dripping up vaginally.  She went into the shower to clean off and still noted bleeding so she presented to the emergency department for further evaluation.     The patient denies any pain.     She notes that in the past she was on Nexplanon but was still bleeding so then transition to a Depo shot which seemed to work though at the end of the 3-month shot she would bleed.  Eventually she went to an urgent care due to this and was given birth control pills but since then has noticed a strong odor.  She denies dysuria.      Independent Historian:    None - Patient Only    Review of External Notes:  2/6/23 PCP note: Transdermal contraceptive prescribed.    ROS:  See HPI    Allergies:  No Known Allergies     Physical Exam   Patient Vitals for the past 24 hrs:   BP Temp Temp src Pulse Resp SpO2 Weight   04/24/23 1543 (!) 129/91 -- -- 95 -- 100 % --   04/24/23 1257 (!) 139/106 98.4  F (36.9  C) Temporal 99 18 100 % 52.2 kg (115 lb)        Physical Exam  Constitutional: Vital signs reviewed as above.   HEENT:    Head: No external signs of trauma noted.    Eyes: Conjunctivae are normal. Pupils are equal, round, and reactive to light.    Cardiovascular: Normal rate, regular rhythm and normal heart sounds.    Pulmonary/Chest: Effort normal and breath sounds normal. No respiratory distress. Patient has no wheezes.   Gastrointestinal: Soft, non-tender, non-distended.  Skin: Skin is warm and dry. No diaphoresis noted.   Neurological: Patient is alert and oriented to person, place, and time.     Emergency Department Course       Imaging:  US Pelvic Complete with Transvaginal    Final Result   IMPRESSION:   1.  Normal pelvic ultrasound.                  Report per radiology    Laboratory:  Labs Ordered and Resulted from Time of ED Arrival to Time of ED Departure   ROUTINE UA WITH MICROSCOPIC REFLEX TO CULTURE - Abnormal       Result Value    Color Urine Red (*)     Appearance Urine Slightly Cloudy (*)     Glucose Urine Negative      Bilirubin Urine Negative      Ketones Urine Negative      Specific Gravity Urine 1.012      Blood Urine Large (*)     pH Urine 6.0      Protein Albumin Urine 100 (*)     Urobilinogen Urine Normal      Nitrite Urine Negative      Leukocyte Esterase Urine Small (*)     Bacteria Urine Few (*)     Mucus Urine Present (*)     RBC Urine >182 (*)     WBC Urine 10 (*)     Squamous Epithelials Urine 1     CBC WITH PLATELETS AND DIFFERENTIAL - Abnormal    WBC Count 3.6 (*)     RBC Count 4.96      Hemoglobin 15.1      Hematocrit 43.4      MCV 88      MCH 30.4      MCHC 34.8      RDW 11.9      Platelet Count 336      % Neutrophils 43      % Lymphocytes 47      % Monocytes 8      % Eosinophils 1      % Basophils 1      % Immature Granulocytes 0      NRBCs per 100 WBC 0      Absolute Neutrophils 1.6      Absolute Lymphocytes 1.7      Absolute Monocytes 0.3      Absolute Eosinophils 0.0      Absolute Basophils 0.0      Absolute Immature Granulocytes 0.0      Absolute NRBCs 0.0     BASIC METABOLIC PANEL - Normal    Sodium 139      Potassium 3.6      Chloride 102      Carbon Dioxide (CO2) 25      Anion Gap 12      Urea Nitrogen 9.0      Creatinine 0.72      Calcium 9.6      Glucose 93      GFR Estimate >90     HCG QUANTITATIVE PREGNANCY - Normal    hCG Quantitative <1     TYPE AND SCREEN, ADULT    ABO/RH(D) O POS      Antibody Screen Negative      SPECIMEN EXPIRATION DATE [id number]     ABO/RH TYPE AND SCREEN        Procedures       Emergency Department Course & Assessments:             Interventions:  Medications - No data to display     Assessments, Independent  Interpretation, Consult/Discussion of ManagementTests:     ED Course as of 23 Rechecked and updated.       Social Determinants of Health affecting care:  None       Disposition:  The patient was discharged to home.     Impression & Plan    CMS Diagnoses: None      Medical Decision Making:  This 27-year-old female patient presents the ED due to vaginal bleeding.  Please see the HPI and exam for specifics.  The patient remained well in the ED.  Fortunately, laboratory studies (specifically hemoglobin) appear reassuring.  Ultrasound imaging does not demonstrate any gross abnormalities.  I think at this point it is reasonable to discharge the patient to have her follow-up with her [place] [facility] clinic.  She notes improvement with birth control pills in the past and I do note in record review that she had been prescribed transdermal contraceptive patches but she states she never took these.  She can always return with new or worsening symptoms.  Anticipatory guidance given prior to discharge.    Critical Care time:  was 0 minutes for this patient excluding procedures.    Diagnosis:    ICD-10-CM    1. Abnormal uterine bleeding  N93.9            Discharge Medications:  New Prescriptions    No medications on file          2023   Jim Peterson DO       Historical Data:  ______________________________________________________________________  Medications:    norelgestromin-ethinyl estradiol (ORTHO EVRA) 150-35 MCG/24HR patch        Past Medical History:   Past Surgical History:     No past medical history on file. Past Surgical History:   Procedure Laterality Date      SECTION        There are no problems to display for this patient.         Family History:    family history includes Hypertension in her mother; No Known Problems in her brother, daughter, and sister. Social History:   reports that she has never smoked. She has never used smokeless tobacco. She  reports that she does not drink alcohol and does not use drugs.     PCP: No Ref-Primary, Physician          Jim Peterson,   04/24/23 1912

## 2023-11-06 ENCOUNTER — HOSPITAL ENCOUNTER (EMERGENCY)
Facility: CLINIC | Age: 28
Discharge: LEFT BEFORE DISCHARGE | End: 2023-11-06
Admitting: EMERGENCY MEDICINE
Payer: COMMERCIAL

## 2023-11-06 VITALS
TEMPERATURE: 97.3 F | SYSTOLIC BLOOD PRESSURE: 115 MMHG | DIASTOLIC BLOOD PRESSURE: 89 MMHG | HEART RATE: 92 BPM | OXYGEN SATURATION: 100 % | RESPIRATION RATE: 16 BRPM

## 2023-11-06 LAB
FLUAV RNA SPEC QL NAA+PROBE: NEGATIVE
FLUBV RNA RESP QL NAA+PROBE: NEGATIVE
GROUP A STREP BY PCR: NOT DETECTED
RSV RNA SPEC NAA+PROBE: NEGATIVE
SARS-COV-2 RNA RESP QL NAA+PROBE: NEGATIVE

## 2023-11-06 PROCEDURE — 87651 STREP A DNA AMP PROBE: CPT | Performed by: EMERGENCY MEDICINE

## 2023-11-06 PROCEDURE — 99281 EMR DPT VST MAYX REQ PHY/QHP: CPT

## 2023-11-06 PROCEDURE — 87637 SARSCOV2&INF A&B&RSV AMP PRB: CPT | Performed by: EMERGENCY MEDICINE

## 2023-11-06 ASSESSMENT — ACTIVITIES OF DAILY LIVING (ADL): ADLS_ACUITY_SCORE: 33

## 2023-11-09 ENCOUNTER — TELEPHONE (OUTPATIENT)
Dept: INTERNAL MEDICINE | Facility: CLINIC | Age: 28
End: 2023-11-09
Payer: COMMERCIAL

## 2023-11-09 NOTE — TELEPHONE ENCOUNTER
Pt calls stating she went to  [facility] and was started on Flagyl for one week for BV.   See Care everywhere for details.     She felt better and then a few days ago, she had pain with intercourse.   Now has white discharge yesterday and has some vaginal irritation.     Pt has Physical Exam with Veronica Olivia, 2/2023.     Please advise, there is VV available tomorrow if can use this for OV?     10/26/23, [facility] notes:   PLAN:   Metronidazole 500 mg b.i.d. for 7 days side effect benefit risk discussed take it with food this is for bacterial vaginosis and follow-up if any concern question she will be informed also about her GC result

## 2023-11-09 NOTE — TELEPHONE ENCOUNTER
She can use my virtual slot at 12:00p.m. for an office visit but could she come in earlier around 8:45-9:00a.m.?    (please do not use the 4:30pm virtual slot)   Let me know what time works for her, we can likely see her whenever in the morning as the visit should be pretty quick

## 2023-11-10 ENCOUNTER — OFFICE VISIT (OUTPATIENT)
Dept: INTERNAL MEDICINE | Facility: CLINIC | Age: 28
End: 2023-11-10
Payer: COMMERCIAL

## 2023-11-10 VITALS
DIASTOLIC BLOOD PRESSURE: 66 MMHG | WEIGHT: 114 LBS | TEMPERATURE: 98.5 F | HEART RATE: 100 BPM | OXYGEN SATURATION: 99 % | HEIGHT: 61 IN | RESPIRATION RATE: 16 BRPM | SYSTOLIC BLOOD PRESSURE: 102 MMHG | BODY MASS INDEX: 21.52 KG/M2

## 2023-11-10 DIAGNOSIS — N89.8 VAGINAL IRRITATION: Primary | ICD-10-CM

## 2023-11-10 PROCEDURE — 99213 OFFICE O/P EST LOW 20 MIN: CPT

## 2023-11-10 PROCEDURE — 87210 SMEAR WET MOUNT SALINE/INK: CPT

## 2023-11-10 RX ORDER — FLUCONAZOLE 150 MG/1
150 TABLET ORAL ONCE
Qty: 1 TABLET | Refills: 1 | Status: SHIPPED | OUTPATIENT
Start: 2023-11-10 | End: 2023-11-10

## 2023-11-10 RX ORDER — LEVONORGESTREL AND ETHINYL ESTRADIOL 0.1-0.02MG
1 KIT ORAL DAILY
COMMUNITY
Start: 2023-10-18 | End: 2024-04-25

## 2023-11-10 NOTE — PROGRESS NOTES
"  Assessment & Plan     (N89.8) Vaginal irritation  (primary encounter diagnosis)  Comment: Patient was seen on 10/26 at  and treated for BV. She presented to  with c/o malodorous discharge. She had + clue cells on wet prep. She completed course of metronidazole 500MG BID x 7 days and symptoms initially resolved.  However, about one week ago she completed antibiotics and had dyspareunia last Thursday. About 2 days ago she noticed increased vaginal discharge- whitish in color. Denies any malodorous discharge, fevers, pelvic pain.   Does endorse vaginal irritation but denies any itching.    She denies any history of recurrent yeast infections or BV.     Wet prep was collected today.  We discussed trying wet wipes (non fragrant) for a few days to help resolve vaginal irritation. Will test for yeast infection today as this may be causing her symptoms.   Plan: Wet prep - lab collect                       SHEEBA Flores CNP  [facility] WENDY Drake is a 27 year old, presenting for the following health issues:  Vaginal Problem      11/10/2023     9:19 AM   Additional Questions   Roomed by Cee   See Previous phone encounter     History of Present Illness       Reason for visit:  New symptoms  Symptom onset:  3-7 days ago  Symptom intensity:  Moderate  Symptom progression:  Staying the same  Had these symptoms before:  No  What makes it worse:  No  What makes it better:  No    She eats 2-3 servings of fruits and vegetables daily.She consumes 1 sweetened beverage(s) daily.She exercises with enough effort to increase her heart rate 9 or less minutes per day.  She exercises with enough effort to increase her heart rate 3 or less days per week.   She is taking medications regularly.           Review of Systems   Genitourinary:  Positive for vaginal discharge.            Objective    /66   Pulse 100   Temp 98.5  F (36.9  C) (Oral)   Resp 16   Ht 1.549 m (5' 1\")   Wt 51.7 " kg (114 lb)   SpO2 99%   BMI 21.54 kg/m    Body mass index is 21.54 kg/m .

## 2024-02-10 ENCOUNTER — NURSE TRIAGE (OUTPATIENT)
Dept: NURSING | Facility: CLINIC | Age: 29
End: 2024-02-10
Payer: COMMERCIAL
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
## 2024-02-14 ENCOUNTER — HOSPITAL ENCOUNTER (EMERGENCY)
Facility: CLINIC | Age: 29
Discharge: HOME | End: 2024-02-14
Attending: EMERGENCY MEDICINE | Admitting: EMERGENCY MEDICINE
Payer: COMMERCIAL

## 2024-02-14 VITALS
WEIGHT: 116 LBS | RESPIRATION RATE: 18 BRPM | SYSTOLIC BLOOD PRESSURE: 142 MMHG | TEMPERATURE: 98.4 F | OXYGEN SATURATION: 99 % | BODY MASS INDEX: 21.9 KG/M2 | DIASTOLIC BLOOD PRESSURE: 90 MMHG | HEART RATE: 100 BPM | HEIGHT: 61 IN

## 2024-02-14 DIAGNOSIS — R00.2 PALPITATIONS: ICD-10-CM

## 2024-02-14 DIAGNOSIS — Z65.9 OTHER SOCIAL STRESSOR: ICD-10-CM

## 2024-02-14 PROBLEM — F43.10 POSTTRAUMATIC STRESS DISORDER: Status: ACTIVE | Noted: 2024-02-14

## 2024-02-14 PROBLEM — F43.23 ADJUSTMENT DISORDER WITH MIXED ANXIETY AND DEPRESSED MOOD: Status: ACTIVE | Noted: 2024-02-14

## 2024-02-14 LAB
ALBUMIN SERPL BCG-MCNC: 4.8 G/DL (ref 3.5–5.2)
ALP SERPL-CCNC: 79 U/L (ref 40–150)
ALT SERPL W P-5'-P-CCNC: 29 U/L (ref 0–50)
ANION GAP SERPL CALCULATED.3IONS-SCNC: 15 MMOL/L (ref 7–15)
AST SERPL W P-5'-P-CCNC: 18 U/L (ref 0–45)
ATRIAL RATE - MUSE: 118 BPM
BASOPHILS # BLD AUTO: 0 10E3/UL (ref 0–0.2)
BASOPHILS NFR BLD AUTO: 1 %
BILIRUB SERPL-MCNC: 0.5 MG/DL
BUN SERPL-MCNC: 10.5 MG/DL (ref 6–20)
CALCIUM SERPL-MCNC: 9.4 MG/DL (ref 8.6–10)
CHLORIDE SERPL-SCNC: 101 MMOL/L (ref 98–107)
CREAT SERPL-MCNC: 0.75 MG/DL (ref 0.51–0.95)
DEPRECATED HCO3 PLAS-SCNC: 22 MMOL/L (ref 22–29)
DIASTOLIC BLOOD PRESSURE - MUSE: NORMAL MMHG
EGFRCR SERPLBLD CKD-EPI 2021: >90 ML/MIN/1.73M2
EOSINOPHIL # BLD AUTO: 0 10E3/UL (ref 0–0.7)
EOSINOPHIL NFR BLD AUTO: 1 %
ERYTHROCYTE [DISTWIDTH] IN BLOOD BY AUTOMATED COUNT: 11.6 % (ref 10–15)
GLUCOSE SERPL-MCNC: 104 MG/DL (ref 70–99)
HCT VFR BLD AUTO: 45.2 % (ref 35–47)
HGB BLD-MCNC: 15.5 G/DL (ref 11.7–15.7)
HOLD SPECIMEN: NORMAL
HOLD SPECIMEN: NORMAL
IMM GRANULOCYTES # BLD: 0 10E3/UL
IMM GRANULOCYTES NFR BLD: 0 %
INTERPRETATION ECG - MUSE: NORMAL
LYMPHOCYTES # BLD AUTO: 2.4 10E3/UL (ref 0.8–5.3)
LYMPHOCYTES NFR BLD AUTO: 49 %
MCH RBC QN AUTO: 29.9 PG (ref 26.5–33)
MCHC RBC AUTO-ENTMCNC: 34.3 G/DL (ref 31.5–36.5)
MCV RBC AUTO: 87 FL (ref 78–100)
MONOCYTES # BLD AUTO: 0.5 10E3/UL (ref 0–1.3)
MONOCYTES NFR BLD AUTO: 10 %
NEUTROPHILS # BLD AUTO: 1.9 10E3/UL (ref 1.6–8.3)
NEUTROPHILS NFR BLD AUTO: 39 %
NRBC # BLD AUTO: 0 10E3/UL
NRBC BLD AUTO-RTO: 0 /100
P AXIS - MUSE: 73 DEGREES
PLATELET # BLD AUTO: 358 10E3/UL (ref 150–450)
POTASSIUM SERPL-SCNC: 3.7 MMOL/L (ref 3.4–5.3)
PR INTERVAL - MUSE: 148 MS
PROT SERPL-MCNC: 8.1 G/DL (ref 6.4–8.3)
QRS DURATION - MUSE: 74 MS
QT - MUSE: 308 MS
QTC - MUSE: 431 MS
R AXIS - MUSE: 90 DEGREES
RBC # BLD AUTO: 5.18 10E6/UL (ref 3.8–5.2)
SODIUM SERPL-SCNC: 138 MMOL/L (ref 135–145)
SYSTOLIC BLOOD PRESSURE - MUSE: NORMAL MMHG
T AXIS - MUSE: 38 DEGREES
TROPONIN T SERPL HS-MCNC: <6 NG/L
VENTRICULAR RATE- MUSE: 118 BPM
WBC # BLD AUTO: 4.8 10E3/UL (ref 4–11)

## 2024-02-14 PROCEDURE — 85025 COMPLETE CBC W/AUTO DIFF WBC: CPT | Performed by: EMERGENCY MEDICINE

## 2024-02-14 PROCEDURE — 96360 HYDRATION IV INFUSION INIT: CPT | Mod: 59

## 2024-02-14 PROCEDURE — 80053 COMPREHEN METABOLIC PANEL: CPT | Performed by: EMERGENCY MEDICINE

## 2024-02-14 PROCEDURE — 258N000003 HC RX IP 258 OP 636: Performed by: EMERGENCY MEDICINE

## 2024-02-14 PROCEDURE — 99284 EMERGENCY DEPT VISIT MOD MDM: CPT | Mod: 25

## 2024-02-14 PROCEDURE — 36415 COLL VENOUS BLD VENIPUNCTURE: CPT | Performed by: EMERGENCY MEDICINE

## 2024-02-14 PROCEDURE — 84484 ASSAY OF TROPONIN QUANT: CPT | Performed by: EMERGENCY MEDICINE

## 2024-02-14 PROCEDURE — 93005 ELECTROCARDIOGRAM TRACING: CPT

## 2024-02-14 RX ADMIN — SODIUM CHLORIDE 1000 ML: 9 INJECTION, SOLUTION INTRAVENOUS at 05:07

## 2024-02-14 ASSESSMENT — ACTIVITIES OF DAILY LIVING (ADL): ADLS_ACUITY_SCORE: 35

## 2024-02-14 NOTE — DISCHARGE INSTRUCTIONS
Recommendations:  -Call established therapist, schedule for weekly sessions  -Consider medication consultation  -Utilize community resources below  ___________________________  Mental Health Out-Patient Clinics:  [facility]   [phone]   *Offers Individual Therapy, Medication Management, In-Home Therapy and Mental Health Case Workers; Can self refer online or by phone.     [facility]   [phone]   *Offers Individual Therapy, Chemical Dependency Evaluations, Group Services, Psychological Testing, In-Home Therapy, Nutrition Counseling, Mother-Baby Programming, Medication Management and Mental Health Case Workers; Can self refer online or by phone.     [facility]   [phone]   *Offers medication management, therapy, DBT Group, In-Home and geriatric care . Can self refer online or by phone.      [facility]  [phone]  *Offers Individual Therapy, Group Services, Psychological Testing, In-Home Therapy, Medication Management, and Intensive Treatment in Foster Care (ITFC) Services; Can self refer online or by phone.      You can also go to [employer] and utilize the filters for insurance, location, gender preference and preferred specialities to read and find providers near you.     [facility] [place] Phone (free remote counseling): [phone] Web address: [email or web address]      _______________________________    Domestic Violence Shelters  [facility] [phone] [facility]  24 Hour [facility] Crisis Number [phone] Information regarding openings in the [facility]      Domestic Violence Hotlines  Rape & Sexual Violence Hotline  668-671-HLLH  [facility] Toll-Free 24h crisis line  [phone]    Domestic Abuse Resources  [place] Domestic Abuse Project [phone]  [facility] [phone]  [facility] [phone]  [facility] [phone]  [facility] 24 Hour Help Line  [phone]    Orders for Protection  Open during business hours only  Most domestic abuse agencies/shelters will assist with orders for protection  [facility] [phone]  [facility] [phone]  [facility] [phone]  [facility] [phone]   ___________________________  Coping Skills:     -I will commit to 30 minutes of self care daily - this can be as simple as taking a shower, going for a walk, cooking a meal, reading, writing, watching something funny, cleaning your home, or evening looking up affirmations.     -I will practice square breathing when I begin to feel anxious - in breath through the nose for the count of 4 and the first line on the square. Out breath through the mouth for the count of 4 for the second line of the square. Repeat to complete the square. Repeat the square as many times as needed.     - I will use distraction skills of: going for walks, watching TV, spending time outside, calling a friend or family member, creating a playlist, write a hand written letter to a loved one, or listening to a pod cast.      -Download a meditation tae and spend 15-20 minutes per day mediating/relaxing. Some apps to download include: Calm, Headspace and Insight Timer. All 3 of these apps have free version     Grounding Techniques:      Try to notice where you are, your surroundings including the people, the sounds like the TV or radio.      Concentrate on your breathing. Take a deep cleansing breath from your diaphragm. Count the breaths as you exhale. Make sure you breath slowly.      Hold something that you find comforting, for some it may be a stuffed animal or a blanket. Notice how it feels in your hands. Is it hard or soft?      During a non-crisis time make a list of positive affirmations. Print them out and keep them handy for times of intense anxiety. At those times, read them aloud.     Try the Prevently game:      Name 5 things you can see in the room with you      Name 4  things you can feel ( clothing on your back  or   fan on your skin )       Name 3 things you can hear right now ( people talking ,  birds  or  tv )       Name 2 things you can smell right now (or, 2 things you like the smell of)       Name 1 good thing about yourself     Create A Safe Place      Image a safe place -- it can be a real or imaginary place:       What do you see -- especially colors?       What sounds do you hear?       What sensations do you feel?       What smells do you smell?       What people or animals would you want in your safe place?       Imagine a protective bubble, wall or boundary around your safe place.       Imagine a door or gate with a guard at your safe place.       Image a lock and key to your safe place and only you can unlock it.      You can draw or make a collage that represents your safe place.       Choose a souvenir of your safe place -- a color, an object, a song.       Keep your image of your safe place so you can come back to it when you need to.      Reduce Extreme Emotion  QUICKLY:  Changing Your Body Chemistry       Change your body Temperature to change your autonomic nervous system       Use Ice Water to calm yourself down FAST       Splash ice water on your face, or hold an ice pack on your face      Intensely exercise to calm down a body revved up by emotion       Examples: running, walking fast, jumping, playing basketball, weight lifting, swimming, calisthenics, etc.       Engage in exercises that DO NOT include violent behaviors. Exercises that utilize violent behaviors tend to function as  behavioral rehearsal,  and rather than calming the person down, may actually  rev  the person up more, increasing the likelihood of violence, and lessening the likelihood that they will  burn off  energy      Progressively relax your muscles       Starting with your hands, moving to your forearms, upper arms, shoulders, neck, forehead, eyes, cheeks and lips, tongue and teeth,  "chest, upper back, stomach, buttocks, thighs, calves, ankles, feet       Tense (10 seconds,   of the way), then relax each muscle (all the way)       Notice the tension       Notice the difference when relaxed (by tensing first, and then relaxing, you are able to get a more thorough relaxation than by simply relaxing)       Paced breathing to relax       The standard technique is to begin with counting the number of steps one takes for a typical inhale, then counting the steps one takes for a typical exhale, and then lengthening the amount of steps for the exhalation by one or two steps.  OR      Repeat this pattern for 1-2 minutes      Inhale for four (4) seconds       Exhale for six (6) to eight (8) seconds       Research demonstrated that one can change one's overall level of anxiety by doing this exercise for even a few minutes per day     Practice Urge Surfing      This is a mindfulness technique that can be used to help reduce impulsive behaviors. Take several big deep breaths and \"ride the wave\" before you act upon negative thoughts or strong reactions.      1. Identify the Physical Sensation in the Body. Stop for a few minutes and be mindful of your physical responses to your urge. You can close your eyes ...  2. Focus on the Sensations. 3. Notice Breathing. 4. Refocus on Your Body. 5 Stay Curious and Present.       Sensations      Squeeze a rubber ball very hard. Listen to very loud music. Hold ice in your hand or mouth. Pay with sensory items. Go out in the rain or snow. Take a hot or cold shower.  Place an ice pack or a warm cloth on your forehead. Remember that you can use your 5 senses as helpful self-soothing techniques.     DBT Skills:    The ABC PLEASE skill is about taking good care of ourselves so that we can take care of others. Also, an important component of DBT is to reduce our vulnerability. When we take good care of ourselves, we are less likely to be vulnerable to disease and emotional " crisis.        ABC      A- Accumulate positive emotions by doing things that are pleasant.      B- Build mastery by doing things we enjoy. Whether it is reading, cooking, cleaning, fixing a car, working a cross word puzzle, or playing a musical instrument. Practice these things to  and in time we feel competent.      C- [place] Ahead by rehearsing a plan ahead of time so that we can be prepared to cope skillfully. (Think of what makes situations difficult, and what helps in those situations)         PLEASE      Treat Physical Illness and take medications as prescribed.      Balance eating in order to avoid mood swings.      Avoid mood-Altering substances and have mood control.      Maintain good sleep so you can enjoy your life.      Get exercise to maintain high spirits.    _____________________________  Safety Recommendations:  -Come back to the Emergency Department with any new or worsening symptoms     -Use community resources, including hotline numbers, [facility] crisis and support meetings     -Maintain a daily schedule/routine     -Practice deep breathing skills     -Disclose my urges to people I trust, such as:  Heidi Carrion, Ernesto, Lulu, Mother and Fahter    -Abstain from all mood altering chemicals not currently prescribed to me      -Reduce caffeine intake (this can exacerbate anxiety and negatively impact sleep)    -Take medications as prescribed. Remove access to large amounts medications, have a pill appiah for any prescribed medications.If appropriate, take medications with the help of loved ones to support daily compliance.       -Talk with family/ loved ones about your prodromal symptoms      -Reflect on symptoms before and after episodes with symptoms to gain insight into triggers and the need for additional care.      -Safety plan in the home, increase observation and check in often with family. Keep door open and be open to touching base with family as often as possible. Consider 24/7  support over the next several days to ensure safety and support.     -Remove any unsafe objects in the home like firearms or sharp objects. Discuss with family on going removal as needed to ensure safety.     -Follow up with out-patient recommended levels of care that were discussed today    -Remember, start where you are, use what you have and do what you can in regard to coping skills and services.
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
## 2024-03-09 ENCOUNTER — HEALTH MAINTENANCE LETTER (OUTPATIENT)
Age: 29
End: 2024-03-09

## 2024-04-25 ENCOUNTER — OFFICE VISIT (OUTPATIENT)
Dept: INTERNAL MEDICINE | Facility: CLINIC | Age: 29
End: 2024-04-25
Payer: COMMERCIAL

## 2024-04-25 ENCOUNTER — ANCILLARY PROCEDURE (OUTPATIENT)
Dept: GENERAL RADIOLOGY | Facility: CLINIC | Age: 29
End: 2024-04-25
Payer: COMMERCIAL

## 2024-04-25 VITALS
TEMPERATURE: 97.9 F | HEIGHT: 61 IN | HEART RATE: 96 BPM | OXYGEN SATURATION: 99 % | BODY MASS INDEX: 22.28 KG/M2 | WEIGHT: 118 LBS | SYSTOLIC BLOOD PRESSURE: 111 MMHG | DIASTOLIC BLOOD PRESSURE: 71 MMHG

## 2024-04-25 DIAGNOSIS — S99.921A FOOT INJURY, RIGHT, INITIAL ENCOUNTER: ICD-10-CM

## 2024-04-25 DIAGNOSIS — S99.921A FOOT INJURY, RIGHT, INITIAL ENCOUNTER: Primary | ICD-10-CM

## 2024-04-25 PROCEDURE — 99213 OFFICE O/P EST LOW 20 MIN: CPT

## 2024-04-25 PROCEDURE — 73630 X-RAY EXAM OF FOOT: CPT | Mod: TC | Performed by: RADIOLOGY

## 2024-04-25 NOTE — PROGRESS NOTES
Assessment & Plan     (S99.921A) Foot injury, right, initial encounter  (primary encounter diagnosis)  Comment: pt has pain on lateral aspect of right foot that radiates residential around dorsal aspect of foot to medial aspect of foot. Incident happened yesterday at work trying to take the brake off of the hospital bed.  Plan: XR Foot Right G/E 3 Views        Provided education on care of foot with ice and Tylenol and elevation and using proper footware. Provided exercises to help increase circulation and reduce chance of stiffness from setting in.                  Subjective   Noelle is a 28 year old, presenting for the following health issues: yesterday morning pt tried to lock the hospital bed using the side of her right foot instead of the pads of her metatarsophalangeal. Walking makes it feel worse. Resting it relieves the pain. Hasn't tried any medication for pain. The pain does not wake her up in the night. The pain is located on the lateral aspect of her foot and radiates residential around the dorsal aspect of foot. There is no edema, no bruise, no incongruity of her ligaments or bones  Musculoskeletal Problem      4/25/2024     3:39 PM   Additional Questions   Roomed by CHAYO Moreno LPN   Accompanied by self         4/25/2024     3:39 PM   Patient Reported Additional Medications   Patient reports taking the following new medications none     HPI               Review of Systems  Constitutional, HEENT, cardiovascular, pulmonary, gi and gu systems are negative, except as otherwise noted.      Objective    LMP 04/18/2024   Breastfeeding No   There is no height or weight on file to calculate BMI.  Physical Exam   GENERAL: alert and no distress  RESP: lungs clear to auscultation - no rales, rhonchi or wheezes  CV: regular rate and rhythm, normal S1 S2, no S3 or S4, no murmur, click or rub, no peripheral edema  MS: no gross musculoskeletal defects noted, no edema  NEURO: Normal strength and tone, mentation intact and  speech normal  PSYCH: mentation appears normal, affect normal/bright            Signed Electronically by: SHEEBA Huertas CNP

## 2024-04-25 NOTE — PATIENT INSTRUCTIONS
Use ice, Tylenol, good stretches for the foot. If the injury does not feel better in the next 2-3 weeks or feels worse return or contact me for an ortho referral for PT

## 2025-03-16 ENCOUNTER — HEALTH MAINTENANCE LETTER (OUTPATIENT)
Age: 30
End: 2025-03-16